# Patient Record
Sex: FEMALE | Race: WHITE | NOT HISPANIC OR LATINO | Employment: FULL TIME | URBAN - NONMETROPOLITAN AREA
[De-identification: names, ages, dates, MRNs, and addresses within clinical notes are randomized per-mention and may not be internally consistent; named-entity substitution may affect disease eponyms.]

---

## 2017-08-03 ENCOUNTER — OFFICE VISIT (OUTPATIENT)
Dept: OBSTETRICS AND GYNECOLOGY | Facility: CLINIC | Age: 57
End: 2017-08-03

## 2017-08-03 VITALS
BODY MASS INDEX: 32.1 KG/M2 | SYSTOLIC BLOOD PRESSURE: 134 MMHG | HEIGHT: 64 IN | DIASTOLIC BLOOD PRESSURE: 82 MMHG | WEIGHT: 188 LBS

## 2017-08-03 DIAGNOSIS — Z01.419 ENCOUNTER FOR GYNECOLOGICAL EXAMINATION WITHOUT ABNORMAL FINDING: Primary | ICD-10-CM

## 2017-08-03 DIAGNOSIS — Z12.72 SPECIAL SCREENING FOR MALIGNANT NEOPLASM OF VAGINA: ICD-10-CM

## 2017-08-03 DIAGNOSIS — N95.1 MENOPAUSAL SYMPTOMS: ICD-10-CM

## 2017-08-03 PROCEDURE — 99396 PREV VISIT EST AGE 40-64: CPT | Performed by: PHYSICIAN ASSISTANT

## 2017-08-03 RX ORDER — ESTRADIOL 0.04 MG/D
1 PATCH, EXTENDED RELEASE TRANSDERMAL 2 TIMES WEEKLY
Qty: 8 PATCH | Refills: 12 | Status: SHIPPED | OUTPATIENT
Start: 2017-08-03 | End: 2017-08-07

## 2017-08-03 RX ORDER — CITALOPRAM 20 MG/1
TABLET ORAL
Refills: 0 | COMMUNITY
Start: 2017-07-07 | End: 2020-02-25 | Stop reason: SDUPTHER

## 2017-08-03 RX ORDER — ATORVASTATIN CALCIUM 20 MG/1
TABLET, FILM COATED ORAL
Refills: 0 | COMMUNITY
Start: 2017-07-07 | End: 2020-02-25 | Stop reason: SDUPTHER

## 2017-08-03 NOTE — PROGRESS NOTES
Subjective   Chief Complaint   Patient presents with   • Gynecologic Exam     Hailey Owens is a 57 y.o. year old  presenting to be seen for her annual exam.   She desires to resume estrogen patch  Ran out of vivelle  0.0375 last August-having miserable night sweats and hot flashes-insomnia  Her last mammogram has been about 2 years ago  Previous hyst bso remote     Past Medical History:   Diagnosis Date   • Anxiety    • Basal cell carcinoma    • High cholesterol    • History of Papanicolaou smear of cervix    • Urinary tract infection         Current Outpatient Prescriptions:   •  atorvastatin (LIPITOR) 20 MG tablet, take 1 tablet by mouth once daily, Disp: , Rfl: 0  •  citalopram (CeleXA) 20 MG tablet, take 1 tablet by mouth once daily, Disp: , Rfl: 0  •  VIVELLE-DOT 0.0375 MG/24HR, Place 1 patch on the skin 2 (Two) Times a Week., Disp: 8 patch, Rfl: 12   Allergies   Allergen Reactions   • Sulfa Antibiotics       Past Surgical History:   Procedure Laterality Date   • BRAIN TUMOR EXCISION     • HYSTERECTOMY     • TONSILLECTOMY        Social History     Social History   • Marital status:      Spouse name: N/A   • Number of children: N/A   • Years of education: N/A     Occupational History   • Not on file.     Social History Main Topics   • Smoking status: Never Smoker   • Smokeless tobacco: Never Used   • Alcohol use Yes      Comment: 2   • Drug use: No   • Sexual activity: Defer     Other Topics Concern   • Not on file     Social History Narrative   • No narrative on file      Family History   Problem Relation Age of Onset   • Brain cancer Father    • Stomach cancer Father    • Stomach cancer Mother    • Colon cancer Mother    • Hyperlipidemia Mother    • Hypertension Sister    • Diabetes Daughter    • Polycystic kidney disease Daughter    • Vaginal cancer Maternal Grandmother        The following portions of the patient's history were reviewed and updated as appropriate:problem list, current  "medications, allergies, past family history, past medical history, past social history and past surgical history.    Review of Systems   Constitutional:        Weight gain   Gastrointestinal: Negative.    Endocrine:        Hot flashes   Genitourinary: Positive for enuresis.   Psychiatric/Behavioral: Positive for sleep disturbance.           Objective   /82  Ht 64\" (162.6 cm)  Wt 188 lb (85.3 kg)  BMI 32.27 kg/m2    Physical Exam   Constitutional: She appears well-developed and well-nourished. She is cooperative.   Pulmonary/Chest: Right breast exhibits no inverted nipple, no mass, no nipple discharge, no skin change and no tenderness. Left breast exhibits no inverted nipple, no mass, no nipple discharge, no skin change and no tenderness.   Abdominal: Soft. Normal appearance. There is no hepatosplenomegaly. There is no tenderness.   Genitourinary: Vagina normal. There is no tenderness or lesion on the right labia. There is no tenderness or lesion on the left labia. Right adnexum displays no mass and no tenderness. Left adnexum displays no mass and no tenderness.   Neurological: She is alert.   Skin: Skin is warm, dry and intact.   Psychiatric: She has a normal mood and affect. Her behavior is normal.            Assessment /Plan      Hailey was seen today for gynecologic exam.    Diagnoses and all orders for this visit:    Encounter for gynecological examination without abnormal finding  -     Mammo Screening Digital Tomosynthesis Bilateral With CAD; Future    Special screening for malignant neoplasm of vagina  -     Liquid-based Pap Smear, Screening; Future    Menopausal symptoms    Other orders  -     VIVELLE-DOT 0.0375 MG/24HR; Place 1 patch on the skin 2 (Two) Times a Week.               This note was electronically signed.    Earlene Castaneda PA-C   August 3, 2017  "

## 2017-08-07 ENCOUNTER — TELEPHONE (OUTPATIENT)
Dept: OBSTETRICS AND GYNECOLOGY | Facility: CLINIC | Age: 57
End: 2017-08-07

## 2017-08-07 RX ORDER — ESTRADIOL 0.04 MG/D
1 FILM, EXTENDED RELEASE TRANSDERMAL 2 TIMES WEEKLY
Qty: 8 PATCH | Refills: 11 | Status: SHIPPED | OUTPATIENT
Start: 2017-08-07 | End: 2022-07-14 | Stop reason: SDUPTHER

## 2017-08-07 NOTE — TELEPHONE ENCOUNTER
PATIENT CALLED AND ADVISED NAME BRAND MELQUIADES TRACEY WAS SENT TO PHARMACY AND HER INSURANCE ONLY PAYS GENERIC. WANTS RESENT UNDER GENERIC.

## 2017-08-14 DIAGNOSIS — Z12.72 SPECIAL SCREENING FOR MALIGNANT NEOPLASM OF VAGINA: ICD-10-CM

## 2017-08-25 ENCOUNTER — HOSPITAL ENCOUNTER (OUTPATIENT)
Dept: MAMMOGRAPHY | Facility: HOSPITAL | Age: 57
Discharge: HOME | End: 2017-08-25
Admitting: PHYSICIAN ASSISTANT

## 2017-08-25 DIAGNOSIS — Z01.419 ENCOUNTER FOR GYNECOLOGICAL EXAMINATION WITHOUT ABNORMAL FINDING: ICD-10-CM

## 2017-08-25 PROCEDURE — 77063 BREAST TOMOSYNTHESIS BI: CPT

## 2017-08-25 PROCEDURE — G0202 SCR MAMMO BI INCL CAD: HCPCS

## 2017-12-02 ENCOUNTER — OFFICE VISIT (OUTPATIENT)
Dept: PRIMARY CARE CLINIC | Age: 57
End: 2017-12-02
Payer: COMMERCIAL

## 2017-12-02 VITALS
BODY MASS INDEX: 32.27 KG/M2 | HEIGHT: 64 IN | SYSTOLIC BLOOD PRESSURE: 118 MMHG | TEMPERATURE: 97.4 F | DIASTOLIC BLOOD PRESSURE: 68 MMHG | WEIGHT: 189 LBS

## 2017-12-02 DIAGNOSIS — G50.0 TRIGEMINAL NEURALGIA PAIN: Primary | ICD-10-CM

## 2017-12-02 PROCEDURE — 99213 OFFICE O/P EST LOW 20 MIN: CPT | Performed by: NURSE PRACTITIONER

## 2017-12-02 RX ORDER — ATORVASTATIN CALCIUM 20 MG/1
20 TABLET, FILM COATED ORAL DAILY
COMMUNITY

## 2017-12-02 RX ORDER — PREDNISONE 10 MG/1
TABLET ORAL
Qty: 1 EACH | Refills: 0 | Status: SHIPPED | OUTPATIENT
Start: 2017-12-02

## 2017-12-02 RX ORDER — BACLOFEN 10 MG/1
10 TABLET ORAL 3 TIMES DAILY
Qty: 30 TABLET | Refills: 0 | Status: SHIPPED | OUTPATIENT
Start: 2017-12-02

## 2017-12-02 RX ORDER — ESTRADIOL 0.04 MG/D
1 PATCH TRANSDERMAL WEEKLY
COMMUNITY

## 2017-12-02 RX ORDER — CITALOPRAM 20 MG/1
20 TABLET ORAL DAILY
COMMUNITY

## 2017-12-02 ASSESSMENT — ENCOUNTER SYMPTOMS
EYE ITCHING: 0
ABDOMINAL DISTENTION: 0
SORE THROAT: 0
NAUSEA: 0
WHEEZING: 0
CHEST TIGHTNESS: 0
SINUS PRESSURE: 0
ABDOMINAL PAIN: 0
CONSTIPATION: 0
EYE REDNESS: 0
SHORTNESS OF BREATH: 0
BLOOD IN STOOL: 0
BACK PAIN: 0
DIARRHEA: 0
COUGH: 0

## 2017-12-02 NOTE — PATIENT INSTRUCTIONS
Patient Education        Trigeminal Neuralgia: Care Instructions  Your Care Instructions  Trigeminal neuralgia is a problem with the large nerve that brings feeling to your face. It causes a sudden, sharp pain on one side of your face. Just touching your cheek or talking can set off shooting pain toward the ear, eye, or nostril. Living with this pain can be very hard. Some people have long periods when they do not have pain, and then it comes back. Some people have periods of pain often. But medicine or other treatment often can make the pain go away. If you keep having pain, surgery may help. This problem is also called tic douloureux (say \"tik doo-jessica-DYLAN\"). Follow-up care is a key part of your treatment and safety. Be sure to make and go to all appointments, and call your doctor if you are having problems. It's also a good idea to know your test results and keep a list of the medicines you take. How can you care for yourself at home? · Write down when you have pain and what you were doing when it started. Try to find what causes the pain. Being in a cold wind, yawning, or shaving are examples. Avoid or limit these triggers if you can. · Be safe with medicines. Take your medicines exactly as prescribed. ¨ Your doctor may have prescribed medicines used to treat depression and seizures. They can reduce your pain, help you sleep better, and improve your mood. ¨ Call your doctor if you think you are having a problem with your medicine. You will get more details on the specific medicines your doctor prescribes. ¨ If you are not taking a prescription pain medicine, take an over-the-counter medicine such as acetaminophen (Tylenol), ibuprofen (Advil, Motrin), or naproxen (Aleve). Read and follow all instructions on the label. ¨ Do not take two or more pain medicines at the same time unless the doctor told you to. Many pain medicines have acetaminophen, which is Tylenol.  Too much acetaminophen (Tylenol) can be harmful. · Reduce stress in your life. Ask your doctor about ways to relax. These may include breathing exercises and massage. · Think about joining a support group with other people who have this problem. These groups can give comfort and information about what to do to feel better. Your doctor can tell you how to find a support group. When should you call for help? Call your doctor now or seek immediate medical care if:  · You have severe pain that you can't control. Watch closely for changes in your health, and be sure to contact your doctor if:  · You are not able to sleep because of the pain. · You do not get better as expected. Where can you learn more? Go to [email or web address] org and sign in to your [employer] account. Enter R378 in the Layer box to learn more about \"Trigeminal Neuralgia: Care Instructions. \"     If you do not have an account, please click on the \"Sign Up Now\" link. Current as of: March 20, 2017  Content Version: 11.3  © 6804-0917 [employer]. Care instructions adapted under license by [facility] ([facility]). If you have questions about a medical condition or this instruction, always ask your healthcare professional. [place] [address] any warranty or liability for your use of this information.

## 2018-01-26 ENCOUNTER — TRANSCRIBE ORDERS (OUTPATIENT)
Dept: ADMINISTRATIVE | Facility: HOSPITAL | Age: 58
End: 2018-01-26

## 2018-01-26 DIAGNOSIS — H92.01 OTALGIA, RIGHT EAR: Primary | ICD-10-CM

## 2018-02-02 ENCOUNTER — HOSPITAL ENCOUNTER (OUTPATIENT)
Dept: MRI IMAGING | Facility: HOSPITAL | Age: 58
Discharge: HOME | End: 2018-02-02
Admitting: FAMILY MEDICINE

## 2018-02-02 DIAGNOSIS — H92.01 OTALGIA, RIGHT EAR: ICD-10-CM

## 2018-02-02 PROCEDURE — A9577 INJ MULTIHANCE: HCPCS | Performed by: FAMILY MEDICINE

## 2018-02-02 PROCEDURE — 0 GADOBENATE DIMEGLUMINE 529 MG/ML SOLUTION: Performed by: FAMILY MEDICINE

## 2018-02-02 PROCEDURE — 82565 ASSAY OF CREATININE: CPT

## 2018-02-02 PROCEDURE — 70553 MRI BRAIN STEM W/O & W/DYE: CPT

## 2018-02-02 RX ADMIN — GADOBENATE DIMEGLUMINE 16 ML: 529 INJECTION, SOLUTION INTRAVENOUS at 15:48

## 2018-02-05 LAB — CREAT BLDA-MCNC: 0.8 MG/DL (ref 0.6–1.3)

## 2018-02-06 ENCOUNTER — OFFICE VISIT (OUTPATIENT)
Dept: NEUROSURGERY | Facility: CLINIC | Age: 58
End: 2018-02-06

## 2018-02-06 VITALS
TEMPERATURE: 97.2 F | DIASTOLIC BLOOD PRESSURE: 80 MMHG | BODY MASS INDEX: 32.74 KG/M2 | SYSTOLIC BLOOD PRESSURE: 142 MMHG | HEIGHT: 64 IN | WEIGHT: 191.8 LBS

## 2018-02-06 DIAGNOSIS — H92.01 OTALGIA OF RIGHT EAR: ICD-10-CM

## 2018-02-06 DIAGNOSIS — M79.2 NEURITIS: Primary | ICD-10-CM

## 2018-02-06 PROBLEM — D49.7 PITUITARY NEOPLASM: Status: ACTIVE | Noted: 2018-02-06

## 2018-02-06 PROCEDURE — 99213 OFFICE O/P EST LOW 20 MIN: CPT | Performed by: NEUROLOGICAL SURGERY

## 2018-02-06 NOTE — PATIENT INSTRUCTIONS
In 3 weeks, call Dr. Yang on a Monday or Tuesday with an update.   Ask for Sindy,  and leave a message for  Dr. Yang.  He will call you back at the end of the day as soon as he can.     [phone]

## 2018-02-06 NOTE — PROGRESS NOTES
Hailey Owens  1960  [id number]                       CURRENT WORKING DIAGNOSIS:  [Pituitary adenoma ]         MEDICAL HISTORY SINCE LAST ENCOUNTER:  Is patient had pituitary adenoma resected 2009 and is done quite well.  She seen today because of severe pain in her right ear.  Ibuprofen helps.  She's had a recent MRI and is referred for neurosurgical consultation.  The symptoms that she has quite severe when it occurs.  She has been on steroids without avail.  There was some question is whether she had mastoiditis however that has been resolved.  Also she has been on antibiotics.           Past Medical History:   Diagnosis Date   • Anxiety    • Basal cell carcinoma    • High cholesterol    • History of Papanicolaou smear of cervix 2015   • Urinary tract infection               Past Surgical History:   Procedure Laterality Date   • BRAIN TUMOR EXCISION     • HYSTERECTOMY     • TONSILLECTOMY                Family History   Problem Relation Age of Onset   • Brain cancer Father    • Stomach cancer Father    • Stomach cancer Mother    • Colon cancer Mother    • Hyperlipidemia Mother    • Hypertension Sister    • Diabetes Daughter    • Polycystic kidney disease Daughter    • Vaginal cancer Maternal Grandmother               Social History     Social History   • Marital status:      Spouse name: N/A   • Number of children: N/A   • Years of education: N/A     Occupational History   • Not on file.     Social History Main Topics   • Smoking status: Former Smoker     Quit date: 1989   • Smokeless tobacco: Never Used   • Alcohol use Yes      Comment: 2   • Drug use: No   • Sexual activity: Defer     Other Topics Concern   • Not on file     Social History Narrative              Allergies   Allergen Reactions   • Sulfa Antibiotics               Current Outpatient Prescriptions:   •  atorvastatin (LIPITOR) 20 MG tablet, take 1 tablet by mouth once daily, Disp: , Rfl: 0  •  citalopram (CeleXA) 20 MG tablet, take 1 tablet  by mouth once daily, Disp: , Rfl: 0  •  estradiol (VIVELLE-DOT) 0.0375 MG/24HR, Place 1 patch on the skin 2 (Two) Times a Week., Disp: 8 patch, Rfl: 11         Review of Systems   Constitutional: Negative for activity change, appetite change, chills, diaphoresis, fatigue, fever and unexpected weight change.   HENT: Positive for ear pain. Negative for congestion, dental problem, drooling, ear discharge, facial swelling, hearing loss, mouth sores, nosebleeds, postnasal drip, rhinorrhea, sinus pressure, sneezing, sore throat, tinnitus, trouble swallowing and voice change.    Eyes: Negative for photophobia, pain, discharge, redness, itching and visual disturbance.   Respiratory: Negative for apnea, cough, choking, chest tightness, shortness of breath, wheezing and stridor.    Cardiovascular: Negative for chest pain, palpitations and leg swelling.   Gastrointestinal: Negative for abdominal distention, abdominal pain, anal bleeding, blood in stool, constipation, diarrhea, nausea, rectal pain and vomiting.   Endocrine: Negative for cold intolerance, heat intolerance, polydipsia, polyphagia and polyuria.   Genitourinary: Negative for decreased urine volume, difficulty urinating, dysuria, enuresis, flank pain, frequency, genital sores, hematuria and urgency.   Musculoskeletal: Negative for arthralgias, back pain, gait problem, joint swelling, myalgias, neck pain and neck stiffness.   Skin: Negative for color change, pallor, rash and wound.   Allergic/Immunologic: Negative for environmental allergies, food allergies and immunocompromised state.   Neurological: Negative for dizziness, tremors, seizures, syncope, facial asymmetry, speech difficulty, weakness, light-headedness, numbness and headaches.   Hematological: Negative for adenopathy. Does not bruise/bleed easily.   Psychiatric/Behavioral: Negative for agitation, behavioral problems, confusion, decreased concentration, dysphoric mood, hallucinations, self-injury, sleep  "disturbance and suicidal ideas. The patient is not nervous/anxious and is not hyperactive.                Vitals:    02/06/18 1516   BP: 142/80   Temp: 97.2 °F (36.2 °C)   Weight: 87 kg (191 lb 12.8 oz)   Height: 162 cm (63.78\")               EXAMINATION: No weakness sensory loss or reflex asymmetry.  Gait is normal.            MEDICAL DECISION MAKING: [The MRI of the brain does not show evidence of recurrence of pituitary adenoma has mild sinusitis.  I do not find an abnormality in the posterior fossa that would provide anatomical/clinical correlation either. ]           ASSESSMENT/DISPOSITION: Suggested that she take 600 ibuprofen on a nightly basis and contact me next 2-3 weeks.  I do not have a ready explanation for the symptoms that she is experiencing.  Clearly there are not related to pituitary surgery.  If the symptoms persist a short course of Neurontin may be worthwhile considering.  Will continue to follow her however.              I APPRECIATE THE OPPORTUNITY OF THIS REFERRAL. PLEASE CALL IF ANY       QUESTIONS [phone]    Scribed for Miko Yang MD by Ihsan Carroll CMA. 2/6/2018  3:30 PM     I have read and concur with the information provided by the scribe.  Miko Yang MD      "

## 2018-08-10 RX ORDER — ESTRADIOL 0.04 MG/D
FILM, EXTENDED RELEASE TRANSDERMAL
Qty: 8 PATCH | Refills: 2 | Status: SHIPPED | OUTPATIENT
Start: 2018-08-10 | End: 2018-12-20 | Stop reason: SDUPTHER

## 2018-11-26 ENCOUNTER — TRANSCRIBE ORDERS (OUTPATIENT)
Dept: ADMINISTRATIVE | Facility: HOSPITAL | Age: 58
End: 2018-11-26

## 2018-11-26 DIAGNOSIS — Z12.39 SCREENING BREAST EXAMINATION: Primary | ICD-10-CM

## 2018-11-28 ENCOUNTER — HOSPITAL ENCOUNTER (OUTPATIENT)
Dept: MAMMOGRAPHY | Facility: HOSPITAL | Age: 58
Discharge: HOME | End: 2018-11-28
Admitting: PHYSICIAN ASSISTANT

## 2018-11-28 DIAGNOSIS — Z12.39 SCREENING BREAST EXAMINATION: ICD-10-CM

## 2018-11-28 PROCEDURE — 77063 BREAST TOMOSYNTHESIS BI: CPT

## 2018-11-28 PROCEDURE — 77067 SCR MAMMO BI INCL CAD: CPT

## 2018-12-20 ENCOUNTER — OFFICE VISIT (OUTPATIENT)
Dept: OBSTETRICS AND GYNECOLOGY | Facility: CLINIC | Age: 58
End: 2018-12-20

## 2018-12-20 VITALS
WEIGHT: 179.8 LBS | DIASTOLIC BLOOD PRESSURE: 80 MMHG | HEIGHT: 64 IN | BODY MASS INDEX: 30.7 KG/M2 | SYSTOLIC BLOOD PRESSURE: 124 MMHG

## 2018-12-20 DIAGNOSIS — N95.1 MENOPAUSAL STATE: ICD-10-CM

## 2018-12-20 DIAGNOSIS — Z01.419 ENCOUNTER FOR GYNECOLOGICAL EXAMINATION WITHOUT ABNORMAL FINDING: Primary | ICD-10-CM

## 2018-12-20 DIAGNOSIS — Z13.820 SCREENING FOR OSTEOPOROSIS: ICD-10-CM

## 2018-12-20 DIAGNOSIS — Z79.890 CURRENT LONG-TERM USE OF POSTMENOPAUSAL HORMONE REPLACEMENT THERAPY: ICD-10-CM

## 2018-12-20 DIAGNOSIS — Z12.4 SCREENING FOR MALIGNANT NEOPLASM OF CERVIX: ICD-10-CM

## 2018-12-20 PROCEDURE — 99396 PREV VISIT EST AGE 40-64: CPT | Performed by: PHYSICIAN ASSISTANT

## 2018-12-20 RX ORDER — ESTRADIOL 0.04 MG/D
1 FILM, EXTENDED RELEASE TRANSDERMAL 2 TIMES WEEKLY
Qty: 24 PATCH | Refills: 3 | Status: SHIPPED | OUTPATIENT
Start: 2018-12-20 | End: 2020-02-25 | Stop reason: DRUGHIGH

## 2018-12-20 RX ORDER — ESTRADIOL 0.04 MG/D
PATCH TRANSDERMAL
COMMUNITY
End: 2018-12-20 | Stop reason: ALTCHOICE

## 2018-12-20 RX ORDER — ATORVASTATIN CALCIUM 20 MG/1
20 TABLET, FILM COATED ORAL
COMMUNITY
End: 2021-04-08 | Stop reason: ALTCHOICE

## 2018-12-20 RX ORDER — CITALOPRAM 20 MG/1
20 TABLET ORAL DAILY
COMMUNITY

## 2018-12-20 RX ORDER — INFLUENZA VIRUS VACCINE 15; 15; 15; 15 UG/.5ML; UG/.5ML; UG/.5ML; UG/.5ML
SUSPENSION INTRAMUSCULAR
Refills: 0 | COMMUNITY
Start: 2018-10-02 | End: 2021-04-08

## 2018-12-20 RX ORDER — BACLOFEN 10 MG/1
10 TABLET ORAL
COMMUNITY
Start: 2017-12-02 | End: 2021-04-08

## 2018-12-20 NOTE — PROGRESS NOTES
Subjective   Chief Complaint   Patient presents with   • Gynecologic Exam     Pap is due; MMG done 18-WNL; No complaints       Hailey Owens is a 59 y.o. year old  presenting to be seen for her annual gynecological exam.   She has no complaints or concerns  She desires to continue vivelle dot 0.0375 mg. Has tried to come off before but didn't feel well. She is aware of breast cancer concern with long term ERT use  Had normal mammogram in November  She has never had DEXA      Past Medical History:   Diagnosis Date   • Anxiety    • Basal cell carcinoma    • High cholesterol    • History of Papanicolaou smear of cervix    • Urinary tract infection         Current Outpatient Medications:   •  atorvastatin (LIPITOR) 20 MG tablet, take 1 tablet by mouth once daily, Disp: , Rfl: 0  •  atorvastatin (LIPITOR) 20 MG tablet, Take 20 mg by mouth., Disp: , Rfl:   •  citalopram (CeleXA) 20 MG tablet, take 1 tablet by mouth once daily, Disp: , Rfl: 0  •  citalopram (CeleXA) 20 MG tablet, Take 20 mg by mouth., Disp: , Rfl:   •  estradiol (VIVELLE-DOT) 0.0375 MG/24HR, Place 1 patch on the skin as directed by provider 2 (Two) Times a Week., Disp: 24 patch, Rfl: 3  •  FLUARIX QUADRIVALENT 0.5 ML suspension prefilled syringe injection, inject 0.5 milliliter intramuscularly, Disp: , Rfl: 0  •  baclofen (LIORESAL) 10 MG tablet, Take 10 mg by mouth., Disp: , Rfl:    Allergies   Allergen Reactions   • Sulfa Antibiotics Hives      Past Surgical History:   Procedure Laterality Date   • BRAIN TUMOR EXCISION     • HYSTERECTOMY     • LAPAROSCOPIC ASSISTED VAGINAL HYSTERECTOMY SALPINGO OOPHORECTOMY     • TONSILLECTOMY        Social History     Socioeconomic History   • Marital status:      Spouse name: Not on file   • Number of children: Not on file   • Years of education: Not on file   • Highest education level: Not on file   Tobacco Use   • Smoking status: Former Smoker     Last attempt to quit:      Years since  "quittin.2   • Smokeless tobacco: Never Used   Substance and Sexual Activity   • Alcohol use: Yes     Comment: 2   • Drug use: No   • Sexual activity: Yes     Partners: Male     Birth control/protection: Surgical      Family History   Problem Relation Age of Onset   • Brain cancer Father    • Stomach cancer Father    • Stomach cancer Mother    • Colon cancer Mother    • Hyperlipidemia Mother    • Hypertension Sister    • Diabetes Daughter    • Polycystic kidney disease Daughter    • Vaginal cancer Maternal Grandmother        Review of Systems   Constitutional: Negative.    Gastrointestinal: Negative.    Genitourinary: Negative.            Objective   /80   Ht 162.6 cm (64\")   Wt 81.6 kg (179 lb 12.8 oz)   Breastfeeding? No   BMI 30.86 kg/m²     Physical Exam   Constitutional: She appears well-developed and well-nourished. She is cooperative. No distress.   Pulmonary/Chest: Right breast exhibits no inverted nipple, no mass, no nipple discharge, no skin change and no tenderness. Left breast exhibits no inverted nipple, no mass, no nipple discharge, no skin change and no tenderness.   Abdominal: Soft. Normal appearance. There is no tenderness.   Genitourinary: Vagina normal and uterus normal. There is no tenderness or lesion on the right labia. There is no tenderness or lesion on the left labia. Cervix exhibits no motion tenderness and no discharge. Right adnexum displays no mass and no tenderness. Left adnexum displays no mass and no tenderness.   Genitourinary Comments: Pap done   Neurological: She is alert.   Skin: Skin is warm and dry.   Psychiatric: She has a normal mood and affect. Her behavior is normal.            Assessment and Plan  Hailey was seen today for gynecologic exam.    Diagnoses and all orders for this visit:    Encounter for gynecological examination without abnormal finding    Screening for malignant neoplasm of cervix  -     Liquid-based Pap Smear, Screening; Future    Current " long-term use of postmenopausal hormone replacement therapy    Menopausal state  -     DEXA Bone Density Axial; Future    Screening for osteoporosis    Other orders  -     estradiol (VIVELLE-DOT) 0.0375 MG/24HR; Place 1 patch on the skin as directed by provider 2 (Two) Times a Week.      Patient Instructions   Self breast exam monthly  Annual mammogram  Calcium 1200 mg daily and vit D 2000 mg daily  Regular excercise             This note was electronically signed.    Earlene Castaneda PA-C   March 22, 2019

## 2019-01-03 DIAGNOSIS — Z12.4 SCREENING FOR MALIGNANT NEOPLASM OF CERVIX: ICD-10-CM

## 2019-01-09 ENCOUNTER — APPOINTMENT (OUTPATIENT)
Dept: BONE DENSITY | Facility: HOSPITAL | Age: 59
End: 2019-01-09

## 2019-01-09 DIAGNOSIS — N95.1 MENOPAUSAL STATE: ICD-10-CM

## 2019-01-09 PROCEDURE — 77080 DXA BONE DENSITY AXIAL: CPT

## 2019-03-21 ENCOUNTER — TELEPHONE (OUTPATIENT)
Dept: OBSTETRICS AND GYNECOLOGY | Facility: CLINIC | Age: 59
End: 2019-03-21

## 2019-03-21 NOTE — TELEPHONE ENCOUNTER
----- Message from Chiquita Rubalcava sent at 3/20/2019  4:25 PM EDT -----  Contact: PT  PT HAD DEXA SCAN ON 1/9/19 AND RECEIVED A BILL BECAUSE OF THE DIAGNOSIS.  SHE WAS TOLD BY [facility] BILLING THAT WE NEED TO FAX A NEW DEXA ORDER STATING THIS IS A PREVENTATIVE EXAM TO ATTN [place] [phone].  SHE ASKED THAT WE CALL AND LET HER KNOW ONCE IT'S BEEN FAXED.  THANKS

## 2019-03-21 NOTE — TELEPHONE ENCOUNTER
Hannah,   This is already been completed, are you able to change the order diagnosis? Patient wants it be screening code?

## 2019-03-22 NOTE — TELEPHONE ENCOUNTER
I spoke with Aneta who is the supervisor in billing in [place] to discuss. I did not change her previous diagnosis from menopausal state but added an addendum screening for osteoporosis code to her visit and Aneta said it is OK to fax order with this screening code which is Z 13.820. Thanks

## 2019-12-16 ENCOUNTER — TRANSCRIBE ORDERS (OUTPATIENT)
Dept: ADMINISTRATIVE | Facility: HOSPITAL | Age: 59
End: 2019-12-16

## 2019-12-16 DIAGNOSIS — Z12.31 ENCOUNTER FOR SCREENING MAMMOGRAM FOR MALIGNANT NEOPLASM OF BREAST: Primary | ICD-10-CM

## 2020-02-07 ENCOUNTER — APPOINTMENT (OUTPATIENT)
Dept: MAMMOGRAPHY | Facility: HOSPITAL | Age: 60
End: 2020-02-07

## 2020-02-25 ENCOUNTER — OFFICE VISIT (OUTPATIENT)
Dept: OBSTETRICS AND GYNECOLOGY | Facility: CLINIC | Age: 60
End: 2020-02-25

## 2020-02-25 VITALS
WEIGHT: 184 LBS | BODY MASS INDEX: 31.41 KG/M2 | HEIGHT: 64 IN | DIASTOLIC BLOOD PRESSURE: 80 MMHG | SYSTOLIC BLOOD PRESSURE: 134 MMHG

## 2020-02-25 DIAGNOSIS — Z79.890 CURRENT LONG-TERM USE OF POSTMENOPAUSAL HORMONE REPLACEMENT THERAPY: ICD-10-CM

## 2020-02-25 DIAGNOSIS — Z12.72 VAGINAL PAP SMEAR: ICD-10-CM

## 2020-02-25 DIAGNOSIS — Z01.419 ENCOUNTER FOR GYNECOLOGICAL EXAMINATION WITHOUT ABNORMAL FINDING: Primary | ICD-10-CM

## 2020-02-25 PROCEDURE — 99396 PREV VISIT EST AGE 40-64: CPT | Performed by: PHYSICIAN ASSISTANT

## 2020-02-25 RX ORDER — ESTRADIOL 0.03 MG/D
1 PATCH, EXTENDED RELEASE TRANSDERMAL 2 TIMES WEEKLY
Qty: 8 PATCH | Refills: 12 | Status: SHIPPED | OUTPATIENT
Start: 2020-02-27 | End: 2020-02-27 | Stop reason: ALTCHOICE

## 2020-02-25 NOTE — PROGRESS NOTES
Subjective   Chief Complaint   Patient presents with   • Gynecologic Exam     Last pap done 18-WNL, MMG is scheduled, No complaints       Hailey Owens is a 60 y.o. year old  presenting to be seen for her annual gynecological exam.   She has no complaints  She has been using Vivelle dot 0.0375 mg and would like to try to decrease to the lowest dose of Vivelle-Dot.  She has her screening mamm[facility] scheduled at [facility].  She had a normal DEXA in 2019.  She has not been consistent in taking calcium or vitamin D supplement.  Previous hysterectomy and bilateral salpingo-oophorectomy    Past Medical History:   Diagnosis Date   • Anxiety    • Basal cell carcinoma    • High cholesterol    • History of Papanicolaou smear of cervix    • Urinary tract infection         Current Outpatient Medications:   •  atorvastatin (LIPITOR) 20 MG tablet, Take 20 mg by mouth., Disp: , Rfl:   •  citalopram (CeleXA) 20 MG tablet, Take 20 mg by mouth., Disp: , Rfl:   •  FLUARIX QUADRIVALENT 0.5 ML suspension prefilled syringe injection, inject 0.5 milliliter intramuscularly, Disp: , Rfl: 0  •  baclofen (LIORESAL) 10 MG tablet, Take 10 mg by mouth., Disp: , Rfl:   •  [START ON 2020] VIVELLE-DOT 0.025 MG/24HR patch, Place 1 patch on the skin as directed by provider 2 (Two) Times a Week., Disp: 8 patch, Rfl: 12   Allergies   Allergen Reactions   • Sulfa Antibiotics Hives      Past Surgical History:   Procedure Laterality Date   • BRAIN TUMOR EXCISION     • HYSTERECTOMY     • LAPAROSCOPIC ASSISTED VAGINAL HYSTERECTOMY SALPINGO OOPHORECTOMY     • TONSILLECTOMY        Social History     Socioeconomic History   • Marital status:      Spouse name: Not on file   • Number of children: Not on file   • Years of education: Not on file   • Highest education level: Not on file   Tobacco Use   • Smoking status: Former Smoker     Last attempt to quit: 1989     Years since quittin.1   • Smokeless tobacco: Never  "Used   Substance and Sexual Activity   • Alcohol use: Yes     Comment: 2   • Drug use: No   • Sexual activity: Yes     Partners: Male     Birth control/protection: Surgical      Family History   Problem Relation Age of Onset   • Brain cancer Father    • Stomach cancer Father    • Stomach cancer Mother    • Colon cancer Mother    • Hyperlipidemia Mother    • Hypertension Sister    • Diabetes Daughter    • Polycystic kidney disease Daughter    • Vaginal cancer Maternal Grandmother        Review of Systems   Constitutional: Negative for chills, diaphoresis and fever.   Gastrointestinal: Negative for abdominal pain, constipation, diarrhea, nausea and vomiting.   Genitourinary: Negative for difficulty urinating, dysuria, pelvic pain, vaginal bleeding and vaginal discharge.   All other systems reviewed and are negative.          Objective   /80   Ht 162.6 cm (64\")   Wt 83.5 kg (184 lb)   Breastfeeding No   BMI 31.58 kg/m²     Physical Exam   Constitutional: She appears well-developed and well-nourished. She is cooperative. No distress.   Eyes: Conjunctivae, EOM and lids are normal.   Pulmonary/Chest: Right breast exhibits no inverted nipple, no mass, no nipple discharge, no skin change and no tenderness. Left breast exhibits no inverted nipple, no mass, no nipple discharge, no skin change and no tenderness.   Abdominal: Soft. Normal appearance. There is no tenderness.   Genitourinary: There is no tenderness or lesion on the right labia. There is no tenderness or lesion on the left labia. Right adnexum displays no mass and no tenderness. Left adnexum displays no mass and no tenderness. No erythema or tenderness in the vagina. No vaginal discharge found.   Genitourinary Comments: Pap done   Neurological: She is alert.   Skin: Skin is warm and dry. No lesion and no rash noted.   Psychiatric: She has a normal mood and affect. Her behavior is normal. Thought content normal.            Assessment and Plan  Hailey was " seen today for gynecologic exam.    Diagnoses and all orders for this visit:    Encounter for gynecological examination without abnormal finding    Vaginal Pap smear  -     Liquid-based Pap Smear, Screening; Future    Current long-term use of postmenopausal hormone replacement therapy    Other orders  -     VIVELLE-DOT 0.025 MG/24HR patch; Place 1 patch on the skin as directed by provider 2 (Two) Times a Week.      Patient Instructions   Self breast exam monthly  Annual mammogram  Calcium 1000 mg daily and vit D 2000 mg daily  Regular excercise             This note was electronically signed.    Earlene Castaneda PA-C   February 25, 2020

## 2020-02-27 RX ORDER — ESTRADIOL 0.03 MG/D
1 FILM, EXTENDED RELEASE TRANSDERMAL 2 TIMES WEEKLY
Qty: 8 PATCH | Refills: 12 | Status: SHIPPED | OUTPATIENT
Start: 2020-02-27 | End: 2021-04-08 | Stop reason: SDUPTHER

## 2020-03-03 DIAGNOSIS — Z12.72 VAGINAL PAP SMEAR: ICD-10-CM

## 2020-03-26 ENCOUNTER — HOSPITAL ENCOUNTER (OUTPATIENT)
Dept: MAMMOGRAPHY | Facility: HOSPITAL | Age: 60
End: 2020-03-26

## 2020-11-28 NOTE — PATIENT INSTRUCTIONS
Anesthesia Self breast exam monthly  Annual mammogram  Calcium 1000 mg daily and vit D 2000 mg daily  Regular excercise

## 2021-02-19 ENCOUNTER — TRANSCRIBE ORDERS (OUTPATIENT)
Dept: ADMINISTRATIVE | Facility: HOSPITAL | Age: 61
End: 2021-02-19

## 2021-02-19 DIAGNOSIS — Z12.31 VISIT FOR SCREENING MAMMOGRAM: Primary | ICD-10-CM

## 2021-04-02 ENCOUNTER — APPOINTMENT (OUTPATIENT)
Dept: MAMMOGRAPHY | Facility: HOSPITAL | Age: 61
End: 2021-04-02

## 2021-04-08 ENCOUNTER — OFFICE VISIT (OUTPATIENT)
Dept: OBSTETRICS AND GYNECOLOGY | Facility: CLINIC | Age: 61
End: 2021-04-08

## 2021-04-08 VITALS
HEIGHT: 64 IN | DIASTOLIC BLOOD PRESSURE: 82 MMHG | WEIGHT: 190.8 LBS | BODY MASS INDEX: 32.58 KG/M2 | SYSTOLIC BLOOD PRESSURE: 138 MMHG

## 2021-04-08 DIAGNOSIS — Z01.419 ENCOUNTER FOR GYNECOLOGICAL EXAMINATION WITHOUT ABNORMAL FINDING: Primary | ICD-10-CM

## 2021-04-08 DIAGNOSIS — Z12.72 VAGINAL PAP SMEAR: ICD-10-CM

## 2021-04-08 DIAGNOSIS — Z79.890 CURRENT LONG-TERM USE OF POSTMENOPAUSAL HORMONE REPLACEMENT THERAPY: ICD-10-CM

## 2021-04-08 PROCEDURE — 99396 PREV VISIT EST AGE 40-64: CPT | Performed by: PHYSICIAN ASSISTANT

## 2021-04-08 RX ORDER — ESTRADIOL 0.03 MG/D
1 FILM, EXTENDED RELEASE TRANSDERMAL 2 TIMES WEEKLY
Qty: 8 PATCH | Refills: 12 | Status: SHIPPED | OUTPATIENT
Start: 2021-04-08 | End: 2022-05-05

## 2021-04-08 NOTE — PATIENT INSTRUCTIONS
Self breast exam monthly  Annual mammogram  Calcium 1200 mg daily and vit D 2000 mg daily  Regular excercise

## 2021-04-08 NOTE — PROGRESS NOTES
Subjective   Chief Complaint   Patient presents with   • Gynecologic Exam     Last pap done 20-WNL, MMG is scheduled, No complaints       Hailey Owens is a 61 y.o. year old  presenting to be seen for her annual gynecological exam.   She has no complaints or concerns.  She has a screening mammogram scheduled May 14.  She had her second Covid vaccine last week.  She desires refills of Vivelle-Dot 0.025 mg.  She had a normal DEXA in .    Past Medical History:   Diagnosis Date   • Anxiety    • Basal cell carcinoma    • High cholesterol    • History of Papanicolaou smear of cervix    • Urinary tract infection         Current Outpatient Medications:   •  citalopram (CeleXA) 20 MG tablet, Take 20 mg by mouth., Disp: , Rfl:   •  estradiol (VIVELLE-DOT) 0.025 MG/24HR patch, Place 1 patch on the skin as directed by provider 2 (Two) Times a Week., Disp: 8 patch, Rfl: 12   Allergies   Allergen Reactions   • Sulfa Antibiotics Hives      Past Surgical History:   Procedure Laterality Date   • BRAIN TUMOR EXCISION     • HYSTERECTOMY     • LAPAROSCOPIC ASSISTED VAGINAL HYSTERECTOMY SALPINGO OOPHORECTOMY     • TONSILLECTOMY        Social History     Socioeconomic History   • Marital status:      Spouse name: Not on file   • Number of children: Not on file   • Years of education: Not on file   • Highest education level: Not on file   Tobacco Use   • Smoking status: Former Smoker     Quit date:      Years since quittin.2   • Smokeless tobacco: Never Used   Vaping Use   • Vaping Use: Never used   Substance and Sexual Activity   • Alcohol use: Yes     Comment: 2   • Drug use: No   • Sexual activity: Yes     Partners: Male     Birth control/protection: Surgical      Family History   Problem Relation Age of Onset   • Brain cancer Father    • Stomach cancer Father    • Stomach cancer Mother    • Colon cancer Mother    • Hyperlipidemia Mother    • Hypertension Sister    • Diabetes Daughter    • Polycystic  "kidney disease Daughter    • Vaginal cancer Maternal Grandmother        Review of Systems   Constitutional: Negative for chills, diaphoresis and fever.   Gastrointestinal: Negative for abdominal pain, constipation, diarrhea, nausea and vomiting.   Genitourinary: Negative for difficulty urinating, dysuria and pelvic pain.   All other systems reviewed and are negative.          Objective   /82   Ht 162.6 cm (64\")   Wt 86.5 kg (190 lb 12.8 oz)   Breastfeeding No   BMI 32.75 kg/m²     Physical Exam  Constitutional:       Appearance: Normal appearance. She is well-developed and well-groomed.   Eyes:      General: Lids are normal.      Extraocular Movements: Extraocular movements intact.      Conjunctiva/sclera: Conjunctivae normal.   Neck:      Thyroid: No thyroid mass or thyromegaly.   Chest:      Breasts: Breasts are symmetrical.         Right: No inverted nipple, mass, nipple discharge, skin change or tenderness.         Left: No inverted nipple, mass, nipple discharge, skin change or tenderness.   Abdominal:      General: There is no distension.      Palpations: Abdomen is soft. There is no hepatomegaly or splenomegaly.      Tenderness: There is no abdominal tenderness.   Genitourinary:     Exam position: Lithotomy position.      Labia:         Right: No rash, tenderness or lesion.         Left: No rash, tenderness or lesion.       Urethra: No prolapse, urethral pain, urethral swelling or urethral lesion.      Vagina: No vaginal discharge, tenderness or lesions.      Uterus: Absent.       Adnexa:         Right: No mass or tenderness.          Left: No mass or tenderness.     Musculoskeletal:      Cervical back: Neck supple.   Lymphadenopathy:      Upper Body:      Right upper body: No axillary adenopathy.      Left upper body: No axillary adenopathy.   Skin:     General: Skin is warm and dry.      Findings: No lesion.   Neurological:      General: No focal deficit present.      Mental Status: She is alert " and oriented to person, place, and time.   Psychiatric:         Attention and Perception: Attention normal.         Mood and Affect: Mood normal.         Speech: Speech normal.         Behavior: Behavior normal.         Thought Content: Thought content normal.            Result Review :                   Assessment and Plan  Diagnoses and all orders for this visit:    1. Encounter for gynecological examination without abnormal finding (Primary)    2. Vaginal Pap smear  -     Liquid-based Pap Smear, Screening; Future    3. Current long-term use of postmenopausal hormone replacement therapy    Other orders  -     estradiol (VIVELLE-DOT) 0.025 MG/24HR patch; Place 1 patch on the skin as directed by provider 2 (Two) Times a Week.  Dispense: 8 patch; Refill: 12      Patient Instructions   Self breast exam monthly  Annual mammogram  Calcium 1200 mg daily and vit D 2000 mg daily  Regular excercise               This note was electronically signed.    Earlene Castaneda PA-C   April 8, 2021

## 2021-04-15 DIAGNOSIS — Z12.72 VAGINAL PAP SMEAR: ICD-10-CM

## 2021-05-14 ENCOUNTER — HOSPITAL ENCOUNTER (OUTPATIENT)
Dept: MAMMOGRAPHY | Facility: HOSPITAL | Age: 61
Discharge: HOME | End: 2021-05-14
Admitting: PHYSICIAN ASSISTANT

## 2021-05-14 DIAGNOSIS — Z12.31 VISIT FOR SCREENING MAMMOGRAM: ICD-10-CM

## 2021-05-14 PROCEDURE — 77067 SCR MAMMO BI INCL CAD: CPT

## 2021-05-14 PROCEDURE — 77063 BREAST TOMOSYNTHESIS BI: CPT

## 2021-06-04 ENCOUNTER — HOSPITAL ENCOUNTER (OUTPATIENT)
Dept: ULTRASOUND IMAGING | Facility: HOSPITAL | Age: 61
Discharge: HOME | End: 2021-06-04
Admitting: PHYSICIAN ASSISTANT

## 2021-06-04 DIAGNOSIS — R92.8 ABNORMAL MAMMOGRAM: ICD-10-CM

## 2021-06-04 PROCEDURE — 76642 ULTRASOUND BREAST LIMITED: CPT

## 2022-05-05 RX ORDER — ESTRADIOL 0.03 MG/D
FILM, EXTENDED RELEASE TRANSDERMAL
Qty: 8 PATCH | Refills: 0 | Status: SHIPPED | OUTPATIENT
Start: 2022-05-05 | End: 2022-05-13 | Stop reason: SDUPTHER

## 2022-05-13 ENCOUNTER — TRANSCRIBE ORDERS (OUTPATIENT)
Dept: ADMINISTRATIVE | Facility: HOSPITAL | Age: 62
End: 2022-05-13

## 2022-05-13 ENCOUNTER — TELEPHONE (OUTPATIENT)
Dept: OBSTETRICS AND GYNECOLOGY | Facility: CLINIC | Age: 62
End: 2022-05-13

## 2022-05-13 DIAGNOSIS — Z12.31 VISIT FOR SCREENING MAMMOGRAM: Primary | ICD-10-CM

## 2022-05-13 RX ORDER — ESTRADIOL 0.03 MG/D
1 FILM, EXTENDED RELEASE TRANSDERMAL 2 TIMES WEEKLY
Qty: 8 PATCH | Refills: 2 | Status: SHIPPED | OUTPATIENT
Start: 2022-05-16 | End: 2022-07-14 | Stop reason: SDUPTHER

## 2022-05-13 NOTE — TELEPHONE ENCOUNTER
----- Message from Shania Carroll MA sent at 5/13/2022  1:17 PM EDT -----  Patient is requesting refill on her hormone patch until her Annual in July    [facility]     [facility]      Thank You

## 2022-07-14 ENCOUNTER — OFFICE VISIT (OUTPATIENT)
Dept: OBSTETRICS AND GYNECOLOGY | Facility: CLINIC | Age: 62
End: 2022-07-14

## 2022-07-14 VITALS
HEIGHT: 64 IN | WEIGHT: 191.8 LBS | DIASTOLIC BLOOD PRESSURE: 76 MMHG | SYSTOLIC BLOOD PRESSURE: 140 MMHG | BODY MASS INDEX: 32.74 KG/M2

## 2022-07-14 DIAGNOSIS — N81.11 CYSTOCELE, MIDLINE: ICD-10-CM

## 2022-07-14 DIAGNOSIS — Z79.890 CURRENT LONG-TERM USE OF POSTMENOPAUSAL HORMONE REPLACEMENT THERAPY: ICD-10-CM

## 2022-07-14 DIAGNOSIS — Z01.419 WELL WOMAN EXAM WITH ROUTINE GYNECOLOGICAL EXAM: Primary | ICD-10-CM

## 2022-07-14 DIAGNOSIS — N39.3 URINARY, INCONTINENCE, STRESS FEMALE: ICD-10-CM

## 2022-07-14 DIAGNOSIS — Z12.4 SCREENING FOR CERVICAL CANCER: ICD-10-CM

## 2022-07-14 PROCEDURE — 99396 PREV VISIT EST AGE 40-64: CPT | Performed by: PHYSICIAN ASSISTANT

## 2022-07-14 RX ORDER — ESTRADIOL 0.03 MG/D
1 FILM, EXTENDED RELEASE TRANSDERMAL 2 TIMES WEEKLY
Qty: 8 PATCH | Refills: 12 | Status: SHIPPED | OUTPATIENT
Start: 2022-07-14

## 2022-07-14 RX ORDER — ROSUVASTATIN CALCIUM 40 MG/1
40 TABLET, COATED ORAL DAILY
COMMUNITY
Start: 2022-06-13

## 2022-07-14 NOTE — PROGRESS NOTES
Subjective   Chief Complaint   Patient presents with   • Gynecologic Exam     Last pap done 21-WNL, MMG is scheduled, requesting a refill for Vivelle Dot, No complaints       Hailey Owens is a 62 y.o. year old  presenting to be seen for her annual gynecological exam.   She has screening mammogram scheduled for next week.  She has had previous hysterectomy and bilateral salpingo-oophorectomy.  She is requesting to stay on her 0.025 mg Vivelle-Dot estrogen patch  She experiences stress urine incontinence on occasion. No urgency or frequency. No nocturia      Past Medical History:   Diagnosis Date   • Anxiety    • Basal cell carcinoma    • Endometriosis     Hysterectomy   • High cholesterol    • History of Papanicolaou smear of cervix    • Ovarian cyst     Hysterectomy   • Polycystic ovary syndrome     Hysterectomy   • Urinary tract infection    • Varicella     childhood        Current Outpatient Medications:   •  citalopram (CeleXA) 20 MG tablet, Take 20 mg by mouth., Disp: , Rfl:   •  estradiol (VIVELLE-DOT) 0.025 MG/24HR patch, Place 1 patch on the skin as directed by provider 2 (Two) Times a Week., Disp: 8 patch, Rfl: 12  •  rosuvastatin (CRESTOR) 40 MG tablet, , Disp: , Rfl:    Allergies   Allergen Reactions   • Sulfa Antibiotics Hives      Past Surgical History:   Procedure Laterality Date   • BRAIN TUMOR EXCISION     • CATARACT EXTRACTION  2015 left eye    2022 rt. eye scheduled   • HYSTERECTOMY     • LAPAROSCOPIC ASSISTED VAGINAL HYSTERECTOMY SALPINGO OOPHORECTOMY     • PELVIC LAPAROSCOPY     • TONSILLECTOMY     • TUBAL ABDOMINAL LIGATION     • WISDOM TOOTH EXTRACTION        Social History     Socioeconomic History   • Marital status:    Tobacco Use   • Smoking status: Former Smoker     Packs/day: 0.00     Years: 10.00     Pack years: 0.00     Types: Cigarettes     Start date: 1979     Quit date: 1989     Years since quittin.5   • Smokeless tobacco: Never Used   • Tobacco  "comment: smoked 1 pack a day   Vaping Use   • Vaping Use: Never used   Substance and Sexual Activity   • Alcohol use: Yes     Alcohol/week: 2.0 standard drinks     Types: 2 Glasses of wine per week     Comment: 2   • Drug use: No   • Sexual activity: Yes     Partners: Male     Birth control/protection: Surgical      Family History   Problem Relation Age of Onset   • Brain cancer Father    • Stomach cancer Father    • Stomach cancer Mother    • Colon cancer Mother    • Hyperlipidemia Mother    • Hypertension Sister    • Diabetes Daughter    • Polycystic kidney disease Daughter    • Hypertension Daughter    • Vaginal cancer Maternal Grandmother    • Deep vein thrombosis Brother         behind right knee       Review of Systems   Constitutional: Negative for chills, diaphoresis and fever.   Gastrointestinal: Negative.    Genitourinary: Negative for difficulty urinating, dysuria and pelvic pain.           Objective   /76   Ht 162.6 cm (64\")   Wt 87 kg (191 lb 12.8 oz)   Breastfeeding No   BMI 32.92 kg/m²     Physical Exam  Constitutional:       Appearance: Normal appearance. She is well-developed and well-groomed.   Eyes:      General: Lids are normal.      Extraocular Movements: Extraocular movements intact.      Conjunctiva/sclera: Conjunctivae normal.   Chest:   Breasts: Breasts are symmetrical.      Right: No inverted nipple, mass, nipple discharge, skin change, tenderness or axillary adenopathy.      Left: No inverted nipple, mass, nipple discharge, skin change, tenderness or axillary adenopathy.       Abdominal:      General: There is no distension.      Palpations: Abdomen is soft.      Tenderness: There is no abdominal tenderness.   Genitourinary:     Labia:         Right: No rash, tenderness or lesion.         Left: No rash, tenderness or lesion.       Urethra: No prolapse, urethral pain, urethral swelling or urethral lesion.      Vagina: No signs of injury. No vaginal discharge or tenderness.      " Uterus: Absent.       Adnexa:         Right: No mass or tenderness.          Left: No mass or tenderness.        Rectum: No external hemorrhoid.      Comments: Grade 1 cystocele with coughing  Lymphadenopathy:      Upper Body:      Right upper body: No axillary adenopathy.      Left upper body: No axillary adenopathy.   Skin:     General: Skin is warm and dry.      Findings: No bruising or lesion.   Neurological:      General: No focal deficit present.      Mental Status: She is alert and oriented to person, place, and time.   Psychiatric:         Attention and Perception: Attention normal.         Mood and Affect: Mood normal.         Speech: Speech normal.         Behavior: Behavior is cooperative.            Result Review :                   Assessment and Plan  Diagnoses and all orders for this visit:    1. Well woman exam with routine gynecological exam (Primary)    2. Screening for cervical cancer  -     LIQUID-BASED PAP SMEAR, [facility] ([facility])    3. Urinary, incontinence, stress female    4. Cystocele, midline    5. Current long-term use of postmenopausal hormone replacement therapy    Other orders  -     estradiol (VIVELLE-DOT) 0.025 MG/24HR patch; Place 1 patch on the skin as directed by provider 2 (Two) Times a Week.  Dispense: 8 patch; Refill: 12      Patient Instructions   Self breast exam monthly  Annual mammogram  Calcium 1200 mg daily and vit D 2000 mg daily  Regular excercise   Encourage Kegels daily and avoidance of strenuous lifting             This note was electronically signed.    Earlene Castaneda PA-C   July 14, 2022

## 2022-07-14 NOTE — PATIENT INSTRUCTIONS
Self breast exam monthly  Annual mammogram  Calcium 1200 mg daily and vit D 2000 mg daily  Regular excercise   Encourage Kegels daily and avoidance of strenuous lifting

## 2022-07-20 LAB — REF LAB TEST METHOD: NORMAL

## 2022-07-21 ENCOUNTER — HOSPITAL ENCOUNTER (OUTPATIENT)
Dept: MAMMOGRAPHY | Facility: HOSPITAL | Age: 62
Discharge: HOME | End: 2022-07-21
Admitting: PHYSICIAN ASSISTANT

## 2022-07-21 DIAGNOSIS — Z12.31 VISIT FOR SCREENING MAMMOGRAM: ICD-10-CM

## 2022-07-21 PROCEDURE — 77063 BREAST TOMOSYNTHESIS BI: CPT

## 2022-07-21 PROCEDURE — 77067 SCR MAMMO BI INCL CAD: CPT

## 2022-07-22 DIAGNOSIS — R92.8 ABNORMAL MAMMOGRAM: Primary | ICD-10-CM

## 2022-07-22 NOTE — PROGRESS NOTES
Please inform patient her screening mammogram has returned showing abnormality in the left breast.  The radiologist feels that this is most likely a benign cyst but he has recommended an ultrasound to confirm.  I have placed an order for left breast ultrasound and she should be contacted within the next few days to schedule.

## 2022-08-10 ENCOUNTER — PREP FOR SURGERY (OUTPATIENT)
Dept: OTHER | Facility: HOSPITAL | Age: 62
End: 2022-08-10

## 2022-08-10 DIAGNOSIS — H25.11 NUCLEAR SCLEROTIC CATARACT OF RIGHT EYE: Primary | ICD-10-CM

## 2022-08-10 RX ORDER — SODIUM CHLORIDE 0.9 % (FLUSH) 0.9 %
10 SYRINGE (ML) INJECTION AS NEEDED
Status: CANCELLED | OUTPATIENT
Start: 2022-08-10

## 2022-08-10 RX ORDER — CYCLOPENTOLATE HYDROCHLORIDE 20 MG/ML
1 SOLUTION/ DROPS OPHTHALMIC
Status: CANCELLED | OUTPATIENT
Start: 2022-08-10 | End: 2022-08-10

## 2022-08-10 RX ORDER — PHENYLEPHRINE HCL 2.5 %
1 DROPS OPHTHALMIC (EYE)
Status: CANCELLED | OUTPATIENT
Start: 2022-08-10 | End: 2022-08-10

## 2022-08-10 RX ORDER — SODIUM CHLORIDE 0.9 % (FLUSH) 0.9 %
10 SYRINGE (ML) INJECTION EVERY 12 HOURS SCHEDULED
Status: CANCELLED | OUTPATIENT
Start: 2022-08-10

## 2022-08-10 RX ORDER — DIFLUPREDNATE OPHTHALMIC 0.5 MG/ML
1 EMULSION OPHTHALMIC SEE ADMIN INSTRUCTIONS
Status: CANCELLED | OUTPATIENT
Start: 2022-08-10

## 2022-08-10 RX ORDER — TETRACAINE HYDROCHLORIDE 5 MG/ML
1 SOLUTION OPHTHALMIC SEE ADMIN INSTRUCTIONS
Status: CANCELLED | OUTPATIENT
Start: 2022-08-10

## 2022-08-11 PROBLEM — H25.11 NUCLEAR SCLEROTIC CATARACT OF RIGHT EYE: Status: ACTIVE | Noted: 2022-08-11

## 2022-08-17 NOTE — PRE-PROCEDURE INSTRUCTIONS
PAT phone history completed with pt for upcoming procedure on 8/19/22, with Dr. Parks.      PAT PASS GIVEN/REVIEWED WITH PT.  VERBALIZED UNDERSTANDING OF THE FOLLOWING:  DO NOT EAT, DRINK, SMOKE, USE SMOKELESS TOBACCO OR CHEW GUM AFTER MIDNIGHT THE NIGHT BEFORE SURGERY.  THIS ALSO INCLUDES HARD CANDIES AND MINTS.    DO NOT SHAVE THE AREA TO BE OPERATED ON AT LEAST 48 HOURS PRIOR TO THE PROCEDURE.  DO NOT WEAR MAKE UP OR NAIL POLISH.  DO NOT LEAVE IN ANY PIERCING OR WEAR JEWELRY THE DAY OF SURGERY.      DO NOT USE ADHESIVES IF YOU WEAR DENTURES.    DO NOT WEAR EYE CONTACTS; BRING IN YOUR GLASSES.    ONLY TAKE MEDICATION THE MORNING OF YOUR PROCEDURE IF INSTRUCTED BY YOUR SURGEON WITH ENOUGH WATER TO SWALLOW THE MEDICATION.  IF YOUR SURGEON DID NOT SPECIFY WHICH MEDICATIONS TO TAKE, YOU WILL NEED TO CALL THEIR OFFICE FOR FURTHER INSTRUCTIONS AND DO AS THEY INSTRUCT.    LEAVE ANYTHING YOU CONSIDER VALUABLE AT HOME.    YOU WILL NEED TO ARRANGE FOR SOMEONE TO DRIVE YOU HOME AFTER SURGERY.  IT IS RECOMMENDED THAT YOU DO NOT DRIVE, WORK, DRINK ALCOHOL OR MAKE MAJOR DECISIONS FOR AT LEAST 24 HOURS AFTER YOUR PROCEDURE IS COMPLETE.      THE DAY OF YOUR PROCEDURE, BRING IN THE FOLLOWING IF APPLICABLE:   PICTURE ID AND INSURANCE/MEDICARE OR MEDICAID CARDS/ANY CO-PAY THAT MAY BE DUE   COPY OF ADVANCED DIRECTIVE/LIVING WILL/POWER OR    CPAP/BIPAP/INHALERS   SKIN PREP SHEET   YOUR PREADMISSION TESTING PASS (IF NOT A PHONE HISTORY)           COVID self-quarantine instructions reviewed with the pt.  Verbalized understanding.

## 2022-08-19 ENCOUNTER — HOSPITAL ENCOUNTER (OUTPATIENT)
Facility: HOSPITAL | Age: 62
Setting detail: HOSPITAL OUTPATIENT SURGERY
Discharge: HOME | End: 2022-08-19
Attending: OPHTHALMOLOGY | Admitting: OPHTHALMOLOGY

## 2022-08-19 ENCOUNTER — ANESTHESIA (OUTPATIENT)
Dept: PERIOP | Facility: HOSPITAL | Age: 62
End: 2022-08-19

## 2022-08-19 ENCOUNTER — ANESTHESIA EVENT (OUTPATIENT)
Dept: PERIOP | Facility: HOSPITAL | Age: 62
End: 2022-08-19

## 2022-08-19 VITALS
HEIGHT: 64 IN | RESPIRATION RATE: 16 BRPM | WEIGHT: 180 LBS | BODY MASS INDEX: 30.73 KG/M2 | TEMPERATURE: 98.3 F | SYSTOLIC BLOOD PRESSURE: 181 MMHG | DIASTOLIC BLOOD PRESSURE: 83 MMHG | OXYGEN SATURATION: 97 % | HEART RATE: 67 BPM

## 2022-08-19 DIAGNOSIS — H25.11 NUCLEAR SCLEROTIC CATARACT OF RIGHT EYE: ICD-10-CM

## 2022-08-19 PROCEDURE — 25010000002 EPINEPHRINE PER 0.1 MG: Performed by: OPHTHALMOLOGY

## 2022-08-19 PROCEDURE — V2632 POST CHMBR INTRAOCULAR LENS: HCPCS | Performed by: OPHTHALMOLOGY

## 2022-08-19 PROCEDURE — 25010000002 MIDAZOLAM PER 1MG: Performed by: NURSE ANESTHETIST, CERTIFIED REGISTERED

## 2022-08-19 DEVICE — IMPLANTABLE DEVICE: Type: IMPLANTABLE DEVICE | Site: POSTERIOR CHAMBER | Status: FUNCTIONAL

## 2022-08-19 RX ORDER — CYCLOPENTOLATE HYDROCHLORIDE 20 MG/ML
1 SOLUTION/ DROPS OPHTHALMIC
Status: COMPLETED | OUTPATIENT
Start: 2022-08-19 | End: 2022-08-19

## 2022-08-19 RX ORDER — TETRACAINE HYDROCHLORIDE 5 MG/ML
SOLUTION OPHTHALMIC AS NEEDED
Status: DISCONTINUED | OUTPATIENT
Start: 2022-08-19 | End: 2022-08-19 | Stop reason: HOSPADM

## 2022-08-19 RX ORDER — LIDOCAINE HYDROCHLORIDE 20 MG/ML
INJECTION, SOLUTION EPIDURAL; INFILTRATION; INTRACAUDAL; PERINEURAL AS NEEDED
Status: DISCONTINUED | OUTPATIENT
Start: 2022-08-19 | End: 2022-08-19 | Stop reason: HOSPADM

## 2022-08-19 RX ORDER — SODIUM CHLORIDE, SODIUM LACTATE, POTASSIUM CHLORIDE, CALCIUM CHLORIDE 600; 310; 30; 20 MG/100ML; MG/100ML; MG/100ML; MG/100ML
1000 INJECTION, SOLUTION INTRAVENOUS CONTINUOUS
Status: DISCONTINUED | OUTPATIENT
Start: 2022-08-19 | End: 2022-08-19 | Stop reason: HOSPADM

## 2022-08-19 RX ORDER — DIFLUPREDNATE OPHTHALMIC 0.5 MG/ML
EMULSION OPHTHALMIC AS NEEDED
Status: DISCONTINUED | OUTPATIENT
Start: 2022-08-19 | End: 2022-08-19 | Stop reason: HOSPADM

## 2022-08-19 RX ORDER — TETRACAINE HYDROCHLORIDE 5 MG/ML
1 SOLUTION OPHTHALMIC SEE ADMIN INSTRUCTIONS
Status: DISCONTINUED | OUTPATIENT
Start: 2022-08-19 | End: 2022-08-19 | Stop reason: HOSPADM

## 2022-08-19 RX ORDER — DIFLUPREDNATE OPHTHALMIC 0.5 MG/ML
1 EMULSION OPHTHALMIC SEE ADMIN INSTRUCTIONS
Status: DISCONTINUED | OUTPATIENT
Start: 2022-08-19 | End: 2022-08-19 | Stop reason: HOSPADM

## 2022-08-19 RX ORDER — SODIUM CHLORIDE 0.9 % (FLUSH) 0.9 %
10 SYRINGE (ML) INJECTION EVERY 12 HOURS SCHEDULED
Status: DISCONTINUED | OUTPATIENT
Start: 2022-08-19 | End: 2022-08-19 | Stop reason: HOSPADM

## 2022-08-19 RX ORDER — SODIUM CHLORIDE 0.9 % (FLUSH) 0.9 %
10 SYRINGE (ML) INJECTION AS NEEDED
Status: DISCONTINUED | OUTPATIENT
Start: 2022-08-19 | End: 2022-08-19 | Stop reason: HOSPADM

## 2022-08-19 RX ORDER — MIDAZOLAM HYDROCHLORIDE 2 MG/2ML
INJECTION, SOLUTION INTRAMUSCULAR; INTRAVENOUS AS NEEDED
Status: DISCONTINUED | OUTPATIENT
Start: 2022-08-19 | End: 2022-08-19 | Stop reason: SURG

## 2022-08-19 RX ORDER — DIFLUPREDNATE OPHTHALMIC 0.5 MG/ML
EMULSION OPHTHALMIC
Start: 2022-08-19

## 2022-08-19 RX ORDER — BALANCED SALT SOLUTION 6.4; .75; .48; .3; 3.9; 1.7 MG/ML; MG/ML; MG/ML; MG/ML; MG/ML; MG/ML
SOLUTION OPHTHALMIC AS NEEDED
Status: DISCONTINUED | OUTPATIENT
Start: 2022-08-19 | End: 2022-08-19 | Stop reason: HOSPADM

## 2022-08-19 RX ORDER — PHENYLEPHRINE HCL 2.5 %
1 DROPS OPHTHALMIC (EYE)
Status: COMPLETED | OUTPATIENT
Start: 2022-08-19 | End: 2022-08-19

## 2022-08-19 RX ADMIN — CYCLOPENTOLATE HYDROCHLORIDE 1 DROP: 20 SOLUTION/ DROPS OPHTHALMIC at 09:35

## 2022-08-19 RX ADMIN — MIDAZOLAM HYDROCHLORIDE 1 MG: 1 INJECTION, SOLUTION INTRAMUSCULAR; INTRAVENOUS at 10:54

## 2022-08-19 RX ADMIN — PHENYLEPHRINE HYDROCHLORIDE 1 DROP: 25 SOLUTION/ DROPS OPHTHALMIC at 09:25

## 2022-08-19 RX ADMIN — PHENYLEPHRINE HYDROCHLORIDE 1 DROP: 25 SOLUTION/ DROPS OPHTHALMIC at 09:35

## 2022-08-19 RX ADMIN — MIDAZOLAM HYDROCHLORIDE 1 MG: 1 INJECTION, SOLUTION INTRAMUSCULAR; INTRAVENOUS at 10:45

## 2022-08-19 RX ADMIN — TETRACAINE HYDROCHLORIDE 1 DROP: 5 SOLUTION OPHTHALMIC at 09:23

## 2022-08-19 RX ADMIN — CYCLOPENTOLATE HYDROCHLORIDE 1 DROP: 20 SOLUTION/ DROPS OPHTHALMIC at 09:30

## 2022-08-19 RX ADMIN — TETRACAINE HYDROCHLORIDE 1 DROP: 5 SOLUTION OPHTHALMIC at 09:24

## 2022-08-19 RX ADMIN — PHENYLEPHRINE HYDROCHLORIDE 1 DROP: 25 SOLUTION/ DROPS OPHTHALMIC at 09:30

## 2022-08-19 RX ADMIN — SODIUM CHLORIDE, POTASSIUM CHLORIDE, SODIUM LACTATE AND CALCIUM CHLORIDE: 600; 310; 30; 20 INJECTION, SOLUTION INTRAVENOUS at 11:01

## 2022-08-19 RX ADMIN — SODIUM CHLORIDE, POTASSIUM CHLORIDE, SODIUM LACTATE AND CALCIUM CHLORIDE 1000 ML: 600; 310; 30; 20 INJECTION, SOLUTION INTRAVENOUS at 09:29

## 2022-08-19 RX ADMIN — CYCLOPENTOLATE HYDROCHLORIDE 1 DROP: 20 SOLUTION/ DROPS OPHTHALMIC at 09:25

## 2022-08-19 NOTE — OP NOTE
OPERATIVE NOTE    Date of Procedure: 8/19/2022  Patient Name: Hailey Owens  Patient MRN: [id number]  YOB: 1960     Preoperative Diagnosis: Right nuclear sclerotic cataract.     Postoperative Diagnosis: Right nuclear sclerotic cataract.     Procedure Performed: Phacoemulsification with implantation of a  foldable posterior chamber intraocular lens, Right eye.     Surgeon: Puneet Parks MD     Anesthesia:  Monitored Anesthesia Care (MAC)      Brief History and Indication: The patient presents with a history of past progressive loss of vision.  Patient was diagnosed with cataract and requests removal for increased ability to read and see.     Operation Description: The patient was taken to the OR and prepped and draped in the usual sterile ophthalmic fashion. A lid speculum was placed in the eye.  A 0.8 mm blade was then used to make a stab incision two o’clock hours from the intended temporal clear cornea groove. The anterior chamber was then inflated with a Viscoelastic. A metal microkeratome blade was then used to enter the anterior chamber at the temporal clear cornea site. A three level tunnel incision was made. A curvilinear capsulorrhexis was then performed with a bent cystotome needle and capsulorrhexis forceps.  BSS on a 30 gauge bent cannula was used to hydro-dissect the lens. Good fluid waves were noted. Phacoemulsification was then used to remove nuclear material without complications. The residual cortical and lenticular material was then removed with irrigation and aspiration. Viscoelastics were then used to inflate the bag in a soft shell technique. A PCIOL was injected into the bag. Post-implantation, there were no rents or tears in the bag and the lens was noted to be stable and centered. The residual Viscoelastic was then removed with irrigation and aspiration.  The wound was checked and found to be without leaks. One drop of a Prednisilone was placed in the eye.     target  -2.0    Implant Information:   Implant Name Type Inv. Item Serial No.  Lot No. LRB No. Used Action   LENS ACRYSOF IQ 6X13MM [id number] 10.5 - [id number] - [id number] Implant LENS ACRYSOF IQ 6X13MM SN60WF 10.5 [id number] JUAN CARLOS  Right 1 Implanted       Complications: None    Estimated Blood Loss:  Less than 1 cc.      Discharge and Condition  The patient was transported to same day surgery in excellent condition and scheduled for follow-up appointment. The patient was given instructions on use of eye drops for the operative eye and was specifically instructed to call for any concerns.    Puneet Parks MD  8/19/2022

## 2022-08-19 NOTE — DISCHARGE INSTRUCTIONS
Post Operative Cataract Instructions     Right Eye  POST OPERATIVE INSTRUCTIONS  You have received anesthesia today. DO NOT drive, drink alcohol, sign legal documents.   After surgery, your eye will not hurt. It may feel scratchy, sticky or uncomfortable. Your eye will be sensitive to light.  Most people see better 1-3 days after the procedure, but it could take 3 weeks to get the full benefits and reach your visual potential. If your vision is blurry for a few days it is normal, and means you may have swelling outside or inside the eye. For some patients, a bubble is placed and there will be blurriness.   You should receive a post-op kit with tape and an eye shield. Wear the shield for the first 3 nights after surgery to keep you from rubbing the eye.  Most people are able to return to their normal routine 1-3 days after surgery, however, due to the brain adjusting to your new vision you may have trouble judging distances and want to be careful when driving and going up and downstairs.   You can shower and wash your hair the day after surgery. Keep water, shampoo, hair spray and shaving lotion out of the eye, especially for the first week.  During the first week, you should AVOID:   Rubbing or putting pressure on your eye.  Eye make-up, face cream or lotion, hair coloring or perms  Strenuous activities, such as running or lifting weights, as to avoid sweat from getting in the eye. Avoid swimming, hot tubs, fumes or gumaro conditions.   Keep your head above your heart (no hanging the head down for periods of time).    Some discomfort and blurred vision after surgery are normal, but if you have any unusual pain, swelling, bleeding or sudden decrease in vision, contact our office immediately.     Emergency assistance is available at any time by calling:    Dr.Mark Bronwyn Barnhart  [phone]    If unable to reach call [facility] @  [phone]    You have been prescribed an eye drop to use after surgery, please follow these instructions:    Durezol Shake well before use    USE 1 DROP 4 (FOUR) TIMES A DAY FOR 1 WEEK, WEEK 2: USE 3 (THREE) TIMES A DAY FOR 1 WEEK, WEEK 3: USE 2 (TWO) TIMES A DAY FOR 1 WEEK, WEEK 4: USE 1 (ONE) TIME A DAY FOR 1 WEEK THEN STOP.    PLACE A CHILANGO IN THE DAY COLUMN EACH TIME YOU USE TO KEEP ON SCHEDULE    WEEK 1-USE 4  (FOUR) TIMES A DAY DAY 1   DAY 2 DAY 3 DAY 4 DAY 5 DAY 6 DAY 7     WEEK 2-USE 3 (THREE)  TIMES A DAY DAY 1 DAY 2 DAY 3 DAY 4 DAY 5 DAY 6 DAY 7     WEEK 3-USE 2 (TWO) TIMES A DAY DAY 1 DAY 2 DAY 3 DAY 4 DAY 5 DAY 6 DAY 7     WEEK 4-USE 1 (ONE)TIME A DAY DAY 1 DAY 2 DAY 3 DAY 4 DAY 5 DAY 6 DAY 7       Please follow all post op instructions and follow up appointment time from your physician's office included in your discharge packet.  .  No pushing, pulling, tugging,  heavy lifting, or strenuous activity.  No major decision making, driving, or drinking alcoholic beverages for 24 hours. ( due to the medications you have  received)  Always use good hand hygiene/washing techniques.  NO driving while taking pain medications.    * if you have an incision:  Check your incision area every day for signs of infection.   Check for:  * more redness, swelling, or pain  *more fluid or blood  *warmth  *pus or bad smell T    To assist you in voiding:  Drink plenty of fluids  Listen to running water while attempting to void.    If you are unable to urinate and you have an uncomfortable urge to void or it has been   6 hours since you were discharged, return to the Emergency Room

## 2022-08-19 NOTE — ANESTHESIA POSTPROCEDURE EVALUATION
Patient: Hailey Owens    Procedure Summary     Date: 08/19/22 Room / Location: [facility] OR 1 /  [facility]    Anesthesia Start: 1042 Anesthesia Stop: 1102    Procedure: CATARACT PHACO EXTRACTION WITH INTRAOCULAR LENS IMPLANT RIGHT (Right Eye) Diagnosis:       Nuclear sclerotic cataract of right eye      (Nuclear sclerotic cataract of right eye [H25.11])    Surgeons: Puneet Parks MD Provider: Cain Gomez CRNA    Anesthesia Type: MAC ASA Status: 3          Anesthesia Type: MAC    Vitals  No vitals data found for the desired time range.          Post Anesthesia Care and Evaluation    Patient location during evaluation: bedside  Patient participation: complete - patient participated  Level of consciousness: awake  Pain score: 0  Pain management: adequate    Airway patency: patent  Anesthetic complications: No anesthetic complications  PONV Status: controlled  Cardiovascular status: acceptable and stable  Respiratory status: acceptable and room air  Hydration status: acceptable    Comments: See nursing documentation for post op vital signs

## 2022-08-19 NOTE — H&P
[facility] [place]         History and Physical    Patient Name: Hailey Owens  MRN: [id number]  : 1960  Gender: female     HPI: Patient complaint of PPLOV Right eye diagnosed with cataract. Patient requests PHACO PCIOL for Increase of VA/ADL.    History:    Past Medical History:   Diagnosis Date   • Anxiety    • Basal cell carcinoma    • Elevated cholesterol    • Endometriosis     Hysterectomy   • High cholesterol    • History of Papanicolaou smear of cervix    • Ovarian cyst     Hysterectomy   • Polycystic ovary syndrome     Hysterectomy   • Urinary tract infection    • Varicella     childhood       Past Surgical History:   Procedure Laterality Date   • BRAIN TUMOR EXCISION     • CATARACT EXTRACTION  2015 left eye    2022 rt. eye scheduled   • HYSTERECTOMY     • LAPAROSCOPIC ASSISTED VAGINAL HYSTERECTOMY SALPINGO OOPHORECTOMY     • PELVIC LAPAROSCOPY     • TONSILLECTOMY     • TUBAL ABDOMINAL LIGATION     • WISDOM TOOTH EXTRACTION         Social History     Socioeconomic History   • Marital status:    Tobacco Use   • Smoking status: Former Smoker     Packs/day: 0.00     Years: 10.00     Pack years: 0.00     Types: Cigarettes     Start date: 1979     Quit date: 1989     Years since quittin.6   • Smokeless tobacco: Never Used   • Tobacco comment: smoked 1 pack a day   Vaping Use   • Vaping Use: Never used   Substance and Sexual Activity   • Alcohol use: Yes     Alcohol/week: 2.0 standard drinks     Types: 2 Glasses of wine per week     Comment: Occ   • Drug use: No   • Sexual activity: Defer       Family History   Problem Relation Age of Onset   • Brain cancer Father    • Stomach cancer Father    • Stomach cancer Mother    • Colon cancer Mother    • Hyperlipidemia Mother    • Hypertension Sister    • Diabetes Daughter    • Polycystic kidney disease Daughter    • Hypertension Daughter    • Vaginal cancer Maternal Grandmother    • Deep vein thrombosis Brother          behind right knee       Prior to Admission Medications:  Medications Prior to Admission   Medication Sig Dispense Refill Last Dose   • citalopram (CeleXA) 20 MG tablet Take 20 mg by mouth Daily.   8/19/2022 at 0630   • estradiol (VIVELLE-DOT) 0.025 MG/24HR patch Place 1 patch on the skin as directed by provider 2 (Two) Times a Week. 8 patch 12 8/18/2022 at Unknown time   • rosuvastatin (CRESTOR) 40 MG tablet Take 40 mg by mouth Daily.   8/19/2022 at 0630       Allergies:  Allergies   Allergen Reactions   • Sulfa Antibiotics Hives        Vitals: Temp:  [97.3 °F (36.3 °C)] 97.3 °F (36.3 °C)  Heart Rate:  [62] 62  Resp:  [16] 16  BP: (155)/(75) 155/75    Review of Systems:   Within Normal Limits Abnormal   HEENT [x]    []     Cardiovascular [x]   []     Gastrointestinal [x]   []     Genitourinary [x]   []     Neurologic [x]   []     Pulmonary [x]   []       Physical Exam:   Within Normal Limits Abnormal   HEENT [x]    []     Heart [x]   []     Lungs [x]   []     Abdomen [x]   []     Extremities [x]   []       Impression: Right nuclear sclerotic cataract.     Plan: CATARACT PHACO EXTRACTION WITH INTRAOCULAR LENS IMPLANT RIGHT (Right)     Puneet Parks MD  8/19/2022

## 2022-08-19 NOTE — ANESTHESIA PREPROCEDURE EVALUATION
Anesthesia Evaluation     Patient summary reviewed and Nursing notes reviewed   NPO Solid Status: > 8 hours  NPO Liquid Status: > 8 hours           Airway   Mallampati: II  TM distance: >3 FB  Neck ROM: full  Possible difficult intubation  Dental      Pulmonary    Cardiovascular     (+) hyperlipidemia,       Neuro/Psych  (+) psychiatric history,    GI/Hepatic/Renal/Endo      Musculoskeletal     Abdominal    Substance History      OB/GYN          Other      history of cancer remission    ROS/Med Hx Other: Pituitary neoplasm  Otalgia of right ear  Neuritis  Nuclear sclerotic cataract of right eye                      Anesthesia Plan    ASA 3     MAC     intravenous induction     Anesthetic plan, risks, benefits, and alternatives have been provided, discussed and informed consent has been obtained with: patient.  Pre-procedure education provided  Plan discussed with CRNA.        CODE STATUS:

## 2022-08-26 ENCOUNTER — HOSPITAL ENCOUNTER (OUTPATIENT)
Dept: ULTRASOUND IMAGING | Facility: HOSPITAL | Age: 62
Discharge: HOME | End: 2022-08-26
Admitting: PHYSICIAN ASSISTANT

## 2022-08-26 DIAGNOSIS — R92.8 ABNORMAL MAMMOGRAM: ICD-10-CM

## 2022-08-26 PROCEDURE — 76642 ULTRASOUND BREAST LIMITED: CPT

## 2022-08-27 NOTE — PROGRESS NOTES
Please inform patient her left breast ultrasound has returned showing  areas in the left breast to be cysts.  There were no suspicious features seen.  The radiologist recommends follow-up screening mammogram in 1 year.

## 2023-09-29 ENCOUNTER — TRANSCRIBE ORDERS (OUTPATIENT)
Dept: ADMINISTRATIVE | Facility: HOSPITAL | Age: 63
End: 2023-09-29
Payer: COMMERCIAL

## 2023-09-29 DIAGNOSIS — Z12.31 SCREENING MAMMOGRAM FOR BREAST CANCER: Primary | ICD-10-CM

## 2023-12-08 ENCOUNTER — OFFICE VISIT (OUTPATIENT)
Dept: OBSTETRICS AND GYNECOLOGY | Facility: CLINIC | Age: 63
End: 2023-12-08
Payer: COMMERCIAL

## 2023-12-08 VITALS
BODY MASS INDEX: 32.88 KG/M2 | HEIGHT: 64 IN | SYSTOLIC BLOOD PRESSURE: 148 MMHG | WEIGHT: 192.6 LBS | DIASTOLIC BLOOD PRESSURE: 82 MMHG

## 2023-12-08 DIAGNOSIS — Z01.419 ROUTINE GYNECOLOGICAL EXAMINATION: Primary | ICD-10-CM

## 2023-12-08 DIAGNOSIS — N39.3 SUI (STRESS URINARY INCONTINENCE, FEMALE): ICD-10-CM

## 2023-12-08 DIAGNOSIS — Z79.890 CURRENT LONG-TERM USE OF POSTMENOPAUSAL HORMONE REPLACEMENT THERAPY: ICD-10-CM

## 2023-12-08 DIAGNOSIS — Z12.4 SCREENING FOR CERVICAL CANCER: ICD-10-CM

## 2023-12-08 RX ORDER — ESTRADIOL 0.03 MG/D
1 FILM, EXTENDED RELEASE TRANSDERMAL 2 TIMES WEEKLY
Qty: 8 PATCH | Refills: 12 | Status: SHIPPED | OUTPATIENT
Start: 2023-12-11

## 2023-12-08 RX ORDER — ESTRADIOL 0.1 MG/G
CREAM VAGINAL
Qty: 42.5 G | Refills: 4 | Status: SHIPPED | OUTPATIENT
Start: 2023-12-08

## 2023-12-08 NOTE — PATIENT INSTRUCTIONS
Self breast exam monthly  Annual mammogram  Calcium 1200 mg daily and vit D 2000 mg daily  Regular excercise   Refer urology possible [facility]

## 2023-12-08 NOTE — PROGRESS NOTES
Subjective   Chief Complaint   Patient presents with    Gynecologic Exam     Yearly exam and pap smear       Hailey Owens is a 63 y.o. year old  presenting to be seen for her annual gynecological exam.   She desires to continue low-dose Vivelle-Dot.  She has a mammogram scheduled for January  She has had issues with stress incontinence for quite some time but this has become worse over the last several months.  has occasional frequency and urgency but this is not a big issue for her.  wears pads daily due to the incontinence.  No previous UTI's  Previous hysterectomy and bilateral salpingo-oophorectomy.      Past Medical History:   Diagnosis Date    Anxiety     Basal cell carcinoma     Elevated cholesterol     Endometriosis     Hysterectomy    High cholesterol     History of Papanicolaou smear of cervix 2015    Ovarian cyst     Hysterectomy    Polycystic ovary syndrome     Hysterectomy    Urinary tract infection     Varicella     childhood        Current Outpatient Medications:     citalopram (CeleXA) 20 MG tablet, Take 1 tablet by mouth Daily., Disp: , Rfl:     [START ON 2023] estradiol (VIVELLE-DOT) 0.025 MG/24HR patch, Place 1 patch on the skin as directed by provider 2 (Two) Times a Week., Disp: 8 patch, Rfl: 12    rosuvastatin (CRESTOR) 40 MG tablet, Take 1 tablet by mouth Daily., Disp: , Rfl:     estradiol (ESTRACE VAGINAL) 0.1 MG/GM vaginal cream, Insert 1 gm intravaginally 2 times each week, Disp: 42.5 g, Rfl: 4   Allergies   Allergen Reactions    Sulfa Antibiotics Hives      Past Surgical History:   Procedure Laterality Date    BRAIN TUMOR EXCISION      CATARACT EXTRACTION  2015 left eye    2022 rt. eye scheduled    CATARACT EXTRACTION W/ INTRAOCULAR LENS IMPLANT Right 2022    Procedure: CATARACT PHACO EXTRACTION WITH INTRAOCULAR LENS IMPLANT RIGHT;  Surgeon: Puneet Parks MD;  Location: [facility];  Service: Ophthalmology;  Laterality: Right;    HYSTERECTOMY      LAPAROSCOPIC  "ASSISTED VAGINAL HYSTERECTOMY SALPINGO OOPHORECTOMY      PELVIC LAPAROSCOPY      TONSILLECTOMY      TUBAL ABDOMINAL LIGATION      WISDOM TOOTH EXTRACTION        Social History     Socioeconomic History    Marital status:    Tobacco Use    Smoking status: Former     Packs/day: 0.00     Years: 10.00     Additional pack years: 0.00     Total pack years: 0.00     Types: Cigarettes     Start date: 1979     Quit date: 1989     Years since quittin.9    Smokeless tobacco: Never    Tobacco comments:     smoked 1 pack a day   Vaping Use    Vaping Use: Never used   Substance and Sexual Activity    Alcohol use: Yes     Alcohol/week: 2.0 standard drinks of alcohol     Types: 2 Glasses of wine per week     Comment: Occ    Drug use: No    Sexual activity: Yes     Partners: Male     Birth control/protection: Hysterectomy     Comment:       Family History   Problem Relation Age of Onset    Brain cancer Father     Stomach cancer Father     Stomach cancer Mother     Colon cancer Mother         Stomach and colon cancer    Hyperlipidemia Mother     Hypertension Sister     Diabetes Daughter             Polycystic kidney disease Daughter     Hypertension Daughter         Deseased    Vaginal cancer Maternal Grandmother     Deep vein thrombosis Brother         behind right knee       Review of Systems   Constitutional:  Negative for chills, diaphoresis and fever.   Gastrointestinal:  Negative for constipation, diarrhea, nausea and vomiting.   Genitourinary:  Positive for enuresis. Negative for difficulty urinating, dyspareunia, dysuria, hematuria, pelvic pain and vaginal bleeding.           Objective   /82   Ht 162.6 cm (64\")   Wt 87.4 kg (192 lb 9.6 oz)   BMI 33.06 kg/m²     Physical Exam  Exam conducted with a chaperone present.   Constitutional:       Appearance: Normal appearance. She is well-developed and well-groomed. She is obese.   Eyes:      General: Lids are normal.      Extraocular " Movements: Extraocular movements intact.      Conjunctiva/sclera: Conjunctivae normal.   Chest:   Breasts:     Breasts are symmetrical.      Right: No inverted nipple, mass, nipple discharge, skin change or tenderness.      Left: No inverted nipple, mass, nipple discharge, skin change or tenderness.   Abdominal:      General: There is no distension.      Tenderness: There is no abdominal tenderness.   Genitourinary:     Labia:         Right: No rash, tenderness or lesion.         Left: No rash, tenderness or lesion.       Urethra: No prolapse, urethral pain, urethral swelling or urethral lesion.      Vagina: No vaginal discharge, tenderness, lesions or prolapsed vaginal walls.      Uterus: Absent.       Adnexa:         Right: No mass or tenderness.          Left: No mass or tenderness.        Comments: Mild laxity of vaginal muscles with cough/strain however no significant cystocele or rectocele appreciated. Urine leakage observed with cough   Lymphadenopathy:      Upper Body:      Right upper body: No axillary adenopathy.      Left upper body: No axillary adenopathy.   Neurological:      General: No focal deficit present.      Mental Status: She is alert and oriented to person, place, and time.   Psychiatric:         Attention and Perception: Attention normal.         Mood and Affect: Mood normal.         Speech: Speech normal.         Behavior: Behavior is cooperative.            Result Review :                   Assessment and Plan  Diagnoses and all orders for this visit:    1. Routine gynecological examination (Primary)    2. Screening for cervical cancer  -     LIQUID-BASED PAP SMEAR WITH HPV GENOTYPING REGARDLESS OF INTERPRETATION (ANUSHA,COR,MAD)    3. EDITH (stress urinary incontinence, female)  -     Ambulatory Referral to Urology    4. Current long-term use of postmenopausal hormone replacement therapy    Other orders  -     estradiol (VIVELLE-DOT) 0.025 MG/24HR patch; Place 1 patch on the skin as directed by  provider 2 (Two) Times a Week.  Dispense: 8 patch; Refill: 12  -     estradiol (ESTRACE VAGINAL) 0.1 MG/GM vaginal cream; Insert 1 gm intravaginally 2 times each week  Dispense: 42.5 g; Refill: 4      Patient Instructions   Self breast exam monthly  Annual mammogram  Calcium 1200 mg daily and vit D 2000 mg daily  Regular excercise   Refer urology possible [facility]           This note was electronically signed.    Earlene Castaneda PA-C   December 8, 2023

## 2023-12-12 LAB — REF LAB TEST METHOD: NORMAL

## 2024-01-17 ENCOUNTER — HOSPITAL ENCOUNTER (OUTPATIENT)
Dept: MAMMOGRAPHY | Facility: HOSPITAL | Age: 64
Discharge: HOME | End: 2024-01-17
Admitting: OBSTETRICS & GYNECOLOGY
Payer: MEDICARE

## 2024-01-17 DIAGNOSIS — Z12.31 SCREENING MAMMOGRAM FOR BREAST CANCER: ICD-10-CM

## 2024-01-17 PROCEDURE — 77063 BREAST TOMOSYNTHESIS BI: CPT

## 2024-01-17 PROCEDURE — 77067 SCR MAMMO BI INCL CAD: CPT

## 2024-01-31 ENCOUNTER — OFFICE VISIT (OUTPATIENT)
Dept: UROLOGY | Facility: CLINIC | Age: 64
End: 2024-01-31
Payer: MEDICARE

## 2024-01-31 ENCOUNTER — PATIENT ROUNDING (BHMG ONLY) (OUTPATIENT)
Dept: UROLOGY | Facility: CLINIC | Age: 64
End: 2024-01-31
Payer: MEDICARE

## 2024-01-31 VITALS
DIASTOLIC BLOOD PRESSURE: 84 MMHG | HEIGHT: 64 IN | SYSTOLIC BLOOD PRESSURE: 118 MMHG | TEMPERATURE: 97 F | BODY MASS INDEX: 32.78 KG/M2 | WEIGHT: 192 LBS

## 2024-01-31 DIAGNOSIS — N39.3 SUI (STRESS URINARY INCONTINENCE, FEMALE): Primary | ICD-10-CM

## 2024-01-31 PROBLEM — E78.5 HYPERLIPIDEMIA: Status: ACTIVE | Noted: 2024-01-31

## 2024-01-31 PROBLEM — F41.1 ANXIETY STATE: Status: ACTIVE | Noted: 2024-01-31

## 2024-01-31 LAB
BILIRUB BLD-MCNC: NEGATIVE MG/DL
CLARITY, POC: CLEAR
COLOR UR: YELLOW
EXPIRATION DATE: ABNORMAL
GLUCOSE UR STRIP-MCNC: NEGATIVE MG/DL
KETONES UR QL: NEGATIVE
LEUKOCYTE EST, POC: NEGATIVE
Lab: ABNORMAL
NITRITE UR-MCNC: NEGATIVE MG/ML
PH UR: 6 [PH] (ref 5–8)
PROT UR STRIP-MCNC: ABNORMAL MG/DL
RBC # UR STRIP: ABNORMAL /UL
SP GR UR: 1.01 (ref 1–1.03)
UROBILINOGEN UR QL: NORMAL

## 2024-01-31 RX ORDER — ATORVASTATIN CALCIUM 20 MG/1
20 TABLET, FILM COATED ORAL DAILY
COMMUNITY
End: 2024-01-31

## 2024-01-31 NOTE — PROGRESS NOTES
Chief Complaint  Chief Complaint   Patient presents with    Urinary Incontinence     EDITH          HPI  Ms. Owens is a 64 y.o. female presents with complaint of urinary incontinence for 6 years    Has not previously seen urology.    Pt has primarily Stress urinary incontinence.     Severity: Pt uses 2 pads a day and has tried tried Kegel in the past  Associated Sx: Pt has no h/o daytime frequency, urgency and nocturia     No h/o poor stream, straining, hesitancy or incomplete voiding     No h/o recurrent UTI, hematuria, nephrolithiasis    No vaginal discharge or bleeding  No sensation of POP  No        Fecal incontinence  No Post-void dribbling  No Dyspareunia   No Constipation  1 Vaginal deliveries    Past Medical History  Past Medical History:   Diagnosis Date    Anxiety     Basal cell carcinoma     Elevated cholesterol     Endometriosis     Hysterectomy    High cholesterol     History of Papanicolaou smear of cervix 2015    Ovarian cyst     Hysterectomy    Polycystic ovary syndrome     Hysterectomy    Urinary tract infection     Varicella     childhood       Past Surgical History  Past Surgical History:   Procedure Laterality Date    BRAIN TUMOR EXCISION      CATARACT EXTRACTION  2015 left eye    8/19/2022 rt. eye scheduled    CATARACT EXTRACTION W/ INTRAOCULAR LENS IMPLANT Right 08/19/2022    Procedure: CATARACT PHACO EXTRACTION WITH INTRAOCULAR LENS IMPLANT RIGHT;  Surgeon: Puneet Parks MD;  Location: [facility];  Service: Ophthalmology;  Laterality: Right;    HYSTERECTOMY      LAPAROSCOPIC ASSISTED VAGINAL HYSTERECTOMY SALPINGO OOPHORECTOMY      OOPHORECTOMY      PELVIC LAPAROSCOPY      TONSILLECTOMY      TUBAL ABDOMINAL LIGATION      WISDOM TOOTH EXTRACTION         Medications  Current Outpatient Medications   Medication Sig Dispense Refill    citalopram (CeleXA) 20 MG tablet Take 1 tablet by mouth Daily.      estradiol (ESTRACE VAGINAL) 0.1 MG/GM vaginal cream Insert 1 gm intravaginally 2 times each week  "42.5 g 4    estradiol (VIVELLE-DOT) 0.025 MG/24HR patch Place 1 patch on the skin as directed by provider 2 (Two) Times a Week. 8 patch 12    rosuvastatin (CRESTOR) 40 MG tablet Take 1 tablet by mouth Daily.       No current facility-administered medications for this visit.       Allergies  Allergies   Allergen Reactions    Sulfa Antibiotics Hives and Other (See Comments)       Social History  Social History     Socioeconomic History    Marital status:    Tobacco Use    Smoking status: Former     Packs/day: 0.00     Years: 10.00     Additional pack years: 0.00     Total pack years: 0.00     Types: Cigarettes     Start date: 1979     Quit date: 1989     Years since quittin.1    Smokeless tobacco: Never    Tobacco comments:     smoked 1 pack a day   Vaping Use    Vaping Use: Never used   Substance and Sexual Activity    Alcohol use: Yes     Alcohol/week: 2.0 standard drinks of alcohol     Types: 2 Glasses of wine per week     Comment: Occ    Drug use: No    Sexual activity: Yes     Partners: Male     Birth control/protection: Hysterectomy     Comment:        Family History  Family History   Problem Relation Age of Onset    Stomach cancer Mother     Colon cancer Mother         Stomach and colon cancer    Hyperlipidemia Mother     Brain cancer Father     Stomach cancer Father     Hypertension Sister     Deep vein thrombosis Brother         behind right knee    Diabetes Daughter             Polycystic kidney disease Daughter     Hypertension Daughter         Deseased    Vaginal cancer Maternal Grandmother     Breast cancer Maternal Aunt          Physical Exam  Visit Vitals  /84   Temp 97 °F (36.1 °C)   Ht 162.6 cm (64\")   Wt 87.1 kg (192 lb)   BMI 32.96 kg/m²     Physical exam was notable for grade 1 cystocele, minimal urethral hypermobility, visualized expression of urine with coughing.  Mild denuded thin tissue at posterior introitus    Labs  Brief Urine Lab Results       None " "           Lab Results   Component Value Date    CREATININE 0.80 02/02/2018       No results found for: \"WBC\", \"HGB\", \"HCT\", \"MCV\", \"PLT\"    PVR  Post-void residual performed by staff - 0mL    I have personally reviewed her labs and post void residual imaging.     Assessment  Ms. Owens is a 64 y.o. female with EDITH urinary incontinence.    We discussed the AUA guidelines for management of stress urinary incontinence.  We discussed served of management with lifestyle changes like weight reduction and pelvic floor physical therapy.  We discussed surgical management with either mid urethral sling versus cystoscopy with bulking agent injection.  The patient has elected MUS.  We discussed the risks, benefits, and alternatives including the risk of mesh erosion.  The patient voiced her understanding and wished to proceed.   We discussed risk for bleeding, infection, damage to surrounding organs, and anesthesia complications.     Plan  1. Consented for MUS with Dr. Kruger  Surgical concerns: BMI 32, sulfa allergy      PAUL Pavon, NP-C  [facility] [place]     "

## 2024-02-02 NOTE — PROGRESS NOTES
February 2, 2024    Hello, may I speak with Hailey Owens? Unable to reach pt.    My name is Earlene    I am  with [facility]  [address]  [address]  [facility] [address] [phone].    Before we get started may I verify your date of birth? 1960    I am calling to officially welcome you to our practice and ask about your recent visit. Is this a good time to talk?     Tell me about your visit with us. What things went well?         We're always looking for ways to make our patients' experiences even better. Do you have recommendations on ways we may improve?      Overall were you satisfied with your first visit to our practice?        I appreciate you taking the time to speak with me today. Is there anything else I can do for you?       Thank you, and have a great day.

## 2024-02-05 DIAGNOSIS — R31.29 MICROSCOPIC HEMATURIA: Primary | ICD-10-CM

## 2024-02-08 ENCOUNTER — TELEPHONE (OUTPATIENT)
Dept: SURGERY | Facility: OTHER | Age: 64
End: 2024-02-08
Payer: MEDICARE

## 2024-02-08 ENCOUNTER — TELEPHONE (OUTPATIENT)
Dept: OBSTETRICS AND GYNECOLOGY | Facility: CLINIC | Age: 64
End: 2024-02-08
Payer: MEDICARE

## 2024-02-08 NOTE — TELEPHONE ENCOUNTER
Spoke with patient, she was very worried as knot large and has family history of vaginal cancer. Patient scheduled for appointment tomorrow.

## 2024-02-08 NOTE — TELEPHONE ENCOUNTER
Caller: Hailey Owens    Relationship to patient: Self    Best call back number: [phone] CAN CALL BACK     Chief complaint: LAINE ON VAGINA     Type of visit: GYN FOLLOW UP    Requested date: 02- IN THE MORNING         Additional notes: PEA SIZE LAINE ON VAGINA IT CAME UP IN THE LAST FEW DAYS.  NO DISCHARGE OR PAIN.    PT WOULD ALSO LIKE TO KNOW IF SHE CAN HAVE A UA AT THE OFFICE, TO FOLLOW UP WITH UROLOGIST,  BLOOD AND PROTEIN WAS FOUND IN URINE ON 01-.  UROLOGIST WOULD SEND LAB ORDER.        PT IS CALLING [employer] TO CHECK ON BENEFITS AND WILL CALL BACK.

## 2024-02-09 ENCOUNTER — LAB (OUTPATIENT)
Dept: LAB | Facility: HOSPITAL | Age: 64
End: 2024-02-09
Payer: MEDICARE

## 2024-02-09 ENCOUNTER — OFFICE VISIT (OUTPATIENT)
Dept: OBSTETRICS AND GYNECOLOGY | Facility: CLINIC | Age: 64
End: 2024-02-09
Payer: MEDICARE

## 2024-02-09 VITALS
BODY MASS INDEX: 32.78 KG/M2 | HEIGHT: 64 IN | DIASTOLIC BLOOD PRESSURE: 64 MMHG | SYSTOLIC BLOOD PRESSURE: 118 MMHG | WEIGHT: 192 LBS

## 2024-02-09 DIAGNOSIS — N90.7 VULVAR CYST: Primary | ICD-10-CM

## 2024-02-09 PROCEDURE — 1159F MED LIST DOCD IN RCRD: CPT | Performed by: PHYSICIAN ASSISTANT

## 2024-02-09 PROCEDURE — 99213 OFFICE O/P EST LOW 20 MIN: CPT | Performed by: PHYSICIAN ASSISTANT

## 2024-02-09 PROCEDURE — 80048 BASIC METABOLIC PNL TOTAL CA: CPT | Performed by: NURSE PRACTITIONER

## 2024-02-09 PROCEDURE — 1160F RVW MEDS BY RX/DR IN RCRD: CPT | Performed by: PHYSICIAN ASSISTANT

## 2024-02-09 RX ORDER — CEPHALEXIN 500 MG/1
500 CAPSULE ORAL 2 TIMES DAILY
Qty: 14 CAPSULE | Refills: 0 | Status: SHIPPED | OUTPATIENT
Start: 2024-02-09 | End: 2024-02-16

## 2024-02-09 NOTE — PROGRESS NOTES
"Subjective   Chief Complaint   Patient presents with    Follow-up     Knot on labia       Hailey Owens is a 64 y.o. year old  presenting to be seen for \"knot\" on upper right labia that came up about 1 week ago   Not really tender or bothersome unless she touches it  No drainage from the area. Has remained about the same size since she noted it      Past Medical History:   Diagnosis Date    Anxiety     Basal cell carcinoma     Elevated cholesterol     Endometriosis     Hysterectomy    High cholesterol     History of Papanicolaou smear of cervix 2015    Ovarian cyst     Hysterectomy    Polycystic ovary syndrome     Hysterectomy    Urinary tract infection     Varicella     childhood        Current Outpatient Medications:     citalopram (CeleXA) 20 MG tablet, Take 1 tablet by mouth Daily., Disp: , Rfl:     estradiol (ESTRACE VAGINAL) 0.1 MG/GM vaginal cream, Insert 1 gm intravaginally 2 times each week, Disp: 42.5 g, Rfl: 4    estradiol (VIVELLE-DOT) 0.025 MG/24HR patch, Place 1 patch on the skin as directed by provider 2 (Two) Times a Week., Disp: 8 patch, Rfl: 12    rosuvastatin (CRESTOR) 40 MG tablet, Take 1 tablet by mouth Daily., Disp: , Rfl:     cephalexin (Keflex) 500 MG capsule, Take 1 capsule by mouth 2 (Two) Times a Day for 7 days., Disp: 14 capsule, Rfl: 0   Allergies   Allergen Reactions    Sulfa Antibiotics Hives and Other (See Comments)      Past Surgical History:   Procedure Laterality Date    BRAIN TUMOR EXCISION      CATARACT EXTRACTION   left eye    2022 rt. eye scheduled    CATARACT EXTRACTION W/ INTRAOCULAR LENS IMPLANT Right 2022    Procedure: CATARACT PHACO EXTRACTION WITH INTRAOCULAR LENS IMPLANT RIGHT;  Surgeon: Puneet Parks MD;  Location: [facility];  Service: Ophthalmology;  Laterality: Right;    HYSTERECTOMY      LAPAROSCOPIC ASSISTED VAGINAL HYSTERECTOMY SALPINGO OOPHORECTOMY      OOPHORECTOMY      PELVIC LAPAROSCOPY      TONSILLECTOMY      TUBAL ABDOMINAL " "LIGATION      WISDOM TOOTH EXTRACTION        Social History     Socioeconomic History    Marital status:    Tobacco Use    Smoking status: Former     Packs/day: 0.00     Years: 10.00     Additional pack years: 0.00     Total pack years: 0.00     Types: Cigarettes     Start date: 1979     Quit date: 1989     Years since quittin.1    Smokeless tobacco: Never    Tobacco comments:     smoked 1 pack a day   Vaping Use    Vaping Use: Never used   Substance and Sexual Activity    Alcohol use: Yes     Alcohol/week: 2.0 standard drinks of alcohol     Types: 2 Glasses of wine per week     Comment: Occ    Drug use: No    Sexual activity: Yes     Partners: Male     Birth control/protection: Hysterectomy     Comment:       Family History   Problem Relation Age of Onset    Stomach cancer Mother     Colon cancer Mother         Stomach and colon cancer    Hyperlipidemia Mother     Brain cancer Father     Stomach cancer Father     Hypertension Sister     Deep vein thrombosis Brother         behind right knee    Diabetes Daughter             Polycystic kidney disease Daughter     Hypertension Daughter         Deseased    Vaginal cancer Maternal Grandmother     Breast cancer Maternal Aunt        Review of Systems   Constitutional:  Negative for chills, diaphoresis and fever.   Gastrointestinal:  Negative for constipation, diarrhea, nausea and vomiting.   Genitourinary:  Negative for difficulty urinating, dysuria, pelvic pain, vaginal bleeding and vaginal discharge.           Objective   /64   Ht 162.6 cm (64\")   Wt 87.1 kg (192 lb)   BMI 32.96 kg/m²     Physical Exam  Constitutional:       Appearance: Normal appearance. She is well-developed and well-groomed.   Eyes:      General: Lids are normal.      Extraocular Movements: Extraocular movements intact.      Conjunctiva/sclera: Conjunctivae normal.   Genitourinary:     Labia:         Right: Lesion present. No rash or injury.         Left: " No rash, tenderness, lesion or injury.           Comments: 1 cm firm nodule right upper labia majora. Slightly tender to touch. nonfluctuant  Neurological:      General: No focal deficit present.      Mental Status: She is alert and oriented to person, place, and time.   Psychiatric:         Attention and Perception: Attention normal.         Mood and Affect: Mood normal.         Speech: Speech normal.         Behavior: Behavior is cooperative.            Result Review :                   Assessment and Plan  Diagnoses and all orders for this visit:    1. Vulvar cyst (Primary)    Other orders  -     cephalexin (Keflex) 500 MG capsule; Take 1 capsule by mouth 2 (Two) Times a Day for 7 days.  Dispense: 14 capsule; Refill: 0      Patient Instructions   Encourage most warm compresses to area 3-4 times daily  RTO prn if cyst enlarges or has pain or other concerns            This note was electronically signed.    Earlene Castaneda PA-C   February 9, 2024

## 2024-02-09 NOTE — PATIENT INSTRUCTIONS
Encourage most warm compresses to area 3-4 times daily  RTO prn if cyst enlarges or has pain or other concerns

## 2024-02-13 LAB
APPEARANCE UR: ABNORMAL
BACTERIA #/AREA URNS HPF: ABNORMAL /HPF
BILIRUB UR QL STRIP: NEGATIVE
CASTS URNS MICRO: ABNORMAL
COLOR UR: YELLOW
EPI CELLS #/AREA URNS HPF: ABNORMAL /HPF
GLUCOSE UR QL STRIP: NEGATIVE
HGB UR QL STRIP: ABNORMAL
KETONES UR QL STRIP: NEGATIVE
LEUKOCYTE ESTERASE UR QL STRIP: NEGATIVE
NITRITE UR QL STRIP: NEGATIVE
PH UR STRIP: 5.5 [PH] (ref 5–8)
PROT UR QL STRIP: ABNORMAL
RBC #/AREA URNS HPF: ABNORMAL /HPF
SP GR UR STRIP: 1.02 (ref 1–1.03)
UROBILINOGEN UR STRIP-MCNC: ABNORMAL MG/DL
WBC #/AREA URNS HPF: ABNORMAL /HPF

## 2024-03-05 ENCOUNTER — OFFICE VISIT (OUTPATIENT)
Dept: UROLOGY | Facility: CLINIC | Age: 64
End: 2024-03-05
Payer: MEDICARE

## 2024-03-05 VITALS — DIASTOLIC BLOOD PRESSURE: 78 MMHG | SYSTOLIC BLOOD PRESSURE: 132 MMHG

## 2024-03-05 DIAGNOSIS — R39.9 LOWER URINARY TRACT SYMPTOMS (LUTS): ICD-10-CM

## 2024-03-05 DIAGNOSIS — N39.3 SUI (STRESS URINARY INCONTINENCE, FEMALE): Primary | ICD-10-CM

## 2024-03-05 LAB
BILIRUB BLD-MCNC: NEGATIVE MG/DL
CLARITY, POC: CLEAR
COLOR UR: YELLOW
EXPIRATION DATE: ABNORMAL
GLUCOSE UR STRIP-MCNC: NEGATIVE MG/DL
KETONES UR QL: NEGATIVE
LEUKOCYTE EST, POC: NEGATIVE
Lab: ABNORMAL
NITRITE UR-MCNC: NEGATIVE MG/ML
PH UR: 6 [PH] (ref 5–8)
PROT UR STRIP-MCNC: NEGATIVE MG/DL
RBC # UR STRIP: ABNORMAL /UL
SP GR UR: 1 (ref 1–1.03)
UROBILINOGEN UR QL: NORMAL

## 2024-03-05 NOTE — PROGRESS NOTES
[facility] Female Office Visit      Patient Name: Hailey Owens  : 1960   MRN: [id number]     Chief Complaint:  Lower Urinary Tract Symptoms.   Chief Complaint   Patient presents with    Follow-up       Referring Provider: No ref. provider found    History of Present Illness: Mrs. Owens is a 64 y.o. female with history of EDITH s/p Bulkamid performed on .  She reports that she had no incontinence episodes for approximately 5 days then had a big sneeze and leaked.  Has continued to use 2 pads per day and is overall disappointed in her results.  Denies UUI symptoms today.      Subjective      Review of System: Review of Systems   Constitutional:  Negative for chills, diaphoresis and fever.   Gastrointestinal:  Negative for abdominal pain, constipation, diarrhea and vomiting.   Genitourinary:  Negative for decreased urine volume, dysuria, flank pain, frequency, hematuria and urgency.        Urinary incontinence        Past Medical History:  Past Medical History:   Diagnosis Date    Anxiety     Basal cell carcinoma     Elevated cholesterol     Endometriosis     Hysterectomy    High cholesterol     History of Papanicolaou smear of cervix 2015    Ovarian cyst     Hysterectomy    Polycystic ovary syndrome     Hysterectomy    Urinary tract infection     Varicella     childhood       Past Surgical History:  Past Surgical History:   Procedure Laterality Date    BRAIN TUMOR EXCISION      CATARACT EXTRACTION  2015 left eye    2022 rt. eye scheduled    CATARACT EXTRACTION W/ INTRAOCULAR LENS IMPLANT Right 2022    Procedure: CATARACT PHACO EXTRACTION WITH INTRAOCULAR LENS IMPLANT RIGHT;  Surgeon: Puneet Parks MD;  Location: [facility];  Service: Ophthalmology;  Laterality: Right;    HYSTERECTOMY      LAPAROSCOPIC ASSISTED VAGINAL HYSTERECTOMY SALPINGO OOPHORECTOMY      OOPHORECTOMY      PELVIC LAPAROSCOPY      TONSILLECTOMY      TUBAL ABDOMINAL LIGATION      WISDOM TOOTH EXTRACTION          Medications:    Current Outpatient Medications:     citalopram (CeleXA) 20 MG tablet, Take 1 tablet by mouth Daily., Disp: , Rfl:     estradiol (ESTRACE VAGINAL) 0.1 MG/GM vaginal cream, Insert 1 gm intravaginally 2 times each week, Disp: 42.5 g, Rfl: 4    estradiol (VIVELLE-DOT) 0.025 MG/24HR patch, Place 1 patch on the skin as directed by provider 2 (Two) Times a Week., Disp: 8 patch, Rfl: 12    rosuvastatin (CRESTOR) 40 MG tablet, Take 1 tablet by mouth Daily., Disp: , Rfl:     Allergies:  Allergies   Allergen Reactions    Sulfa Antibiotics Hives and Other (See Comments)       Social History:  Social History     Socioeconomic History    Marital status:    Tobacco Use    Smoking status: Former     Current packs/day: 0.00     Types: Cigarettes     Start date: 1979     Quit date: 1989     Years since quittin.1    Smokeless tobacco: Never    Tobacco comments:     smoked 1 pack a day   Vaping Use    Vaping status: Never Used   Substance and Sexual Activity    Alcohol use: Yes     Alcohol/week: 2.0 standard drinks of alcohol     Types: 2 Glasses of wine per week     Comment: Occ    Drug use: No    Sexual activity: Yes     Partners: Male     Birth control/protection: Hysterectomy     Comment:        Family History:  Family History   Problem Relation Age of Onset    Stomach cancer Mother     Colon cancer Mother         Stomach and colon cancer    Hyperlipidemia Mother     Brain cancer Father     Stomach cancer Father     Hypertension Sister     Deep vein thrombosis Brother         behind right knee    Diabetes Daughter             Polycystic kidney disease Daughter     Hypertension Daughter         Deseased    Vaginal cancer Maternal Grandmother     Breast cancer Maternal Aunt        Objective     Physical Exam:   Vital Signs:   Vitals:    24 1546   BP: 132/78   BP Location: Left arm   Patient Position: Sitting   Cuff Size: Adult     There is no height or weight on file to  calculate BMI.     Physical Exam  Vitals and nursing note reviewed.   Constitutional:       General: She is not in acute distress.     Appearance: Normal appearance. She is not ill-appearing.   HENT:      Head: Normocephalic.   Pulmonary:      Effort: Pulmonary effort is normal.   Neurological:      Mental Status: She is alert and oriented to person, place, and time.   Psychiatric:         Mood and Affect: Mood normal.         Behavior: Behavior normal.       Labs:   Brief Urine Lab Results  (Last result in the past 365 days)        Color   Clarity   Blood   Leuk Est   Nitrite   Protein   CREAT   Urine HCG        03/05/24 1607 Yellow   Clear   Trace   Negative   Negative   Negative                  Lab Results   Component Value Date    GLUCOSE 81 02/09/2024    CALCIUM 9.4 02/09/2024     02/09/2024    K 3.8 02/09/2024    CO2 25.4 02/09/2024     (H) 02/09/2024    BUN 12 02/09/2024    CREATININE 0.91 02/09/2024    BCR 13.2 02/09/2024    ANIONGAP 11.6 02/09/2024     Urine Incontinence: EDITH.     PVR: 0 ml       Assessment / Plan      Assessment  Ms. Owens is a 64 y.o. female with history of stress urinary incontinence s/p Bulkamid performed on February 20th.  She continues to experience stress urinary incontinence and is using 2 pads per day.  PVR today is 0 ml.  UA was negative for infection.  Discussed case with Dr. Kruger and he recommended that she follow up to discuss midurethral sling in in 6-8 weeks.  Mrs. Owens would like to do a trial of weight loss by changing diet and increasing physical activity and would like to follow up with Dr. Kruger in approximately 6 months.  Offered PFPT and patient declined at this time.      Plan  1. Patient to try to lose weight to see if symptoms are improved.    2. Plan to have Mrs. Owens follow up in 6 months per her request with Dr. Kruger to discuss midurethral sling.      Krystyna Yost, APRN, MSN, FNP-C  [facility]

## 2024-09-06 ENCOUNTER — OFFICE VISIT (OUTPATIENT)
Dept: OBSTETRICS AND GYNECOLOGY | Facility: CLINIC | Age: 64
End: 2024-09-06
Payer: MEDICARE

## 2024-09-06 VITALS
BODY MASS INDEX: 32.5 KG/M2 | DIASTOLIC BLOOD PRESSURE: 62 MMHG | HEIGHT: 64 IN | WEIGHT: 190.4 LBS | SYSTOLIC BLOOD PRESSURE: 148 MMHG

## 2024-09-06 DIAGNOSIS — L29.2 VULVAR ITCHING: Primary | ICD-10-CM

## 2024-09-06 DIAGNOSIS — L30.4 INTERTRIGO: ICD-10-CM

## 2024-09-06 PROCEDURE — 1159F MED LIST DOCD IN RCRD: CPT | Performed by: PHYSICIAN ASSISTANT

## 2024-09-06 PROCEDURE — 99213 OFFICE O/P EST LOW 20 MIN: CPT | Performed by: PHYSICIAN ASSISTANT

## 2024-09-06 PROCEDURE — 1160F RVW MEDS BY RX/DR IN RCRD: CPT | Performed by: PHYSICIAN ASSISTANT

## 2024-09-06 RX ORDER — FLUCONAZOLE 150 MG/1
TABLET ORAL
Qty: 2 TABLET | Refills: 1 | Status: SHIPPED | OUTPATIENT
Start: 2024-09-06

## 2024-09-06 RX ORDER — CLOTRIMAZOLE AND BETAMETHASONE DIPROPIONATE 10; .64 MG/G; MG/G
CREAM TOPICAL
Qty: 30 G | Refills: 0 | Status: SHIPPED | OUTPATIENT
Start: 2024-09-06

## 2024-09-06 RX ORDER — ESTRADIOL 0.1 MG/G
CREAM VAGINAL
Qty: 42.5 G | Refills: 4 | Status: SHIPPED | OUTPATIENT
Start: 2024-09-06

## 2024-09-06 NOTE — PATIENT INSTRUCTIONS
Suspect yeast of skin   Follow up prn if symptoms not resolved with fluconazole and Lotrisone  Loose airy clothing at home

## 2024-09-06 NOTE — PROGRESS NOTES
Subjective   Chief Complaint   Patient presents with    vaginal rash     Patient complains of vaginal rash       Hailey Owens is a 64 y.o. year old  presenting to be seen for external vulvar and groin rash.  Rash is red and itches and burns  Had mild rash for several weeks but got much worse when on vacation recently and did a lot of walking  Had Bulkamid therapy in February which did not work. Continues to wear pads daily for EDITH    Past Medical History:   Diagnosis Date    Anxiety     Basal cell carcinoma     Elevated cholesterol     Endometriosis     Hysterectomy    High cholesterol     History of Papanicolaou smear of cervix     Ovarian cyst     Hysterectomy    Polycystic ovary syndrome     Hysterectomy    Urinary tract infection     Varicella     childhood        Current Outpatient Medications:     citalopram (CeleXA) 20 MG tablet, Take 1 tablet by mouth Daily., Disp: , Rfl:     estradiol (ESTRACE VAGINAL) 0.1 MG/GM vaginal cream, Insert 1 gm intravaginally 2 times each week, Disp: 42.5 g, Rfl: 4    estradiol (VIVELLE-DOT) 0.025 MG/24HR patch, Place 1 patch on the skin as directed by provider 2 (Two) Times a Week., Disp: 8 patch, Rfl: 12    rosuvastatin (CRESTOR) 40 MG tablet, Take 1 tablet by mouth Daily., Disp: , Rfl:     clotrimazole-betamethasone (LOTRISONE) 1-0.05 % cream, Apply bid to affected area for 7 days, Disp: 30 g, Rfl: 0    fluconazole (Diflucan) 150 MG tablet, One po now and repeat in 3 days, Disp: 2 tablet, Rfl: 1   Allergies   Allergen Reactions    Sulfa Antibiotics Hives and Other (See Comments)      Past Surgical History:   Procedure Laterality Date    BRAIN TUMOR EXCISION      CATARACT EXTRACTION   left eye    2022 rt. eye scheduled    CATARACT EXTRACTION W/ INTRAOCULAR LENS IMPLANT Right 2022    Procedure: CATARACT PHACO EXTRACTION WITH INTRAOCULAR LENS IMPLANT RIGHT;  Surgeon: Puneet Parks MD;  Location: [facility];  Service: Ophthalmology;  Laterality:  "Right;    HYSTERECTOMY      LAPAROSCOPIC ASSISTED VAGINAL HYSTERECTOMY SALPINGO OOPHORECTOMY      OOPHORECTOMY      PELVIC LAPAROSCOPY      TONSILLECTOMY      TUBAL ABDOMINAL LIGATION      WISDOM TOOTH EXTRACTION        Social History     Socioeconomic History    Marital status:    Tobacco Use    Smoking status: Former     Current packs/day: 0.00     Types: Cigarettes     Start date: 1979     Quit date: 1989     Years since quittin.7    Smokeless tobacco: Never    Tobacco comments:     smoked 1 pack a day   Vaping Use    Vaping status: Never Used   Substance and Sexual Activity    Alcohol use: Yes     Alcohol/week: 2.0 standard drinks of alcohol     Types: 2 Glasses of wine per week     Comment: Occ    Drug use: No    Sexual activity: Yes     Partners: Male     Birth control/protection: Hysterectomy     Comment:       Family History   Problem Relation Age of Onset    Stomach cancer Mother     Colon cancer Mother         Stomach and colon cancer    Hyperlipidemia Mother     Brain cancer Father     Stomach cancer Father     Hypertension Sister     Deep vein thrombosis Brother         behind right knee    Diabetes Daughter             Polycystic kidney disease Daughter     Hypertension Daughter         Deseased    Vaginal cancer Maternal Grandmother     Breast cancer Maternal Aunt        Review of Systems   Constitutional:  Negative for chills, diaphoresis and fever.   Gastrointestinal:  Negative for constipation, diarrhea, nausea and vomiting.   Genitourinary:  Negative for difficulty urinating, dysuria, pelvic pain, vaginal bleeding and vaginal discharge.           Objective   /62   Ht 162.6 cm (64\")   Wt 86.4 kg (190 lb 6.4 oz)   BMI 32.68 kg/m²     Physical Exam  Exam conducted with a chaperone present.   Constitutional:       Appearance: Normal appearance. She is well-developed and well-groomed.   Eyes:      General: Lids are normal.      Extraocular Movements: " Extraocular movements intact.      Conjunctiva/sclera: Conjunctivae normal.   Genitourinary:     Labia:         Right: Rash present. No tenderness, lesion or injury.         Left: Rash present. No tenderness, lesion or injury.       Urethra: No prolapse, urethral pain, urethral swelling or urethral lesion.          Comments: Labia majora bilaterally beefy red as well as groin extending up to mons pubis. No lesions   Neurological:      General: No focal deficit present.      Mental Status: She is alert and oriented to person, place, and time.   Psychiatric:         Attention and Perception: Attention normal.         Mood and Affect: Mood normal.         Speech: Speech normal.         Behavior: Behavior is cooperative.            Result Review :                   Assessment and Plan  Diagnoses and all orders for this visit:    1. Vulvar itching (Primary)    2. Intertrigo    Other orders  -     estradiol (ESTRACE VAGINAL) 0.1 MG/GM vaginal cream; Insert 1 gm intravaginally 2 times each week  Dispense: 42.5 g; Refill: 4  -     fluconazole (Diflucan) 150 MG tablet; One po now and repeat in 3 days  Dispense: 2 tablet; Refill: 1  -     clotrimazole-betamethasone (LOTRISONE) 1-0.05 % cream; Apply bid to affected area for 7 days  Dispense: 30 g; Refill: 0      Patient Instructions   Suspect yeast of skin   Follow up prn if symptoms not resolved with fluconazole and Lotrisone  Loose airy clothing at home           This note was electronically signed.    Earlene Castaneda PA-C   September 6, 2024

## 2025-02-03 ENCOUNTER — TRANSCRIBE ORDERS (OUTPATIENT)
Dept: ADMINISTRATIVE | Facility: HOSPITAL | Age: 65
End: 2025-02-03
Payer: MEDICARE

## 2025-02-03 DIAGNOSIS — Z12.31 SCREENING MAMMOGRAM FOR BREAST CANCER: Primary | ICD-10-CM

## 2025-03-03 RX ORDER — CLOTRIMAZOLE AND BETAMETHASONE DIPROPIONATE 10; .64 MG/G; MG/G
CREAM TOPICAL
Qty: 30 G | Refills: 0 | Status: SHIPPED | OUTPATIENT
Start: 2025-03-03

## 2025-03-06 RX ORDER — ESTRADIOL 0.03 MG/D
1 FILM, EXTENDED RELEASE TRANSDERMAL 2 TIMES WEEKLY
Qty: 8 PATCH | Refills: 12 | Status: SHIPPED | OUTPATIENT
Start: 2025-03-06

## 2025-03-31 ENCOUNTER — HOSPITAL ENCOUNTER (OUTPATIENT)
Dept: MAMMOGRAPHY | Facility: HOSPITAL | Age: 65
Discharge: HOME | End: 2025-03-31
Admitting: OBSTETRICS & GYNECOLOGY
Payer: MEDICARE

## 2025-03-31 DIAGNOSIS — Z12.31 SCREENING MAMMOGRAM FOR BREAST CANCER: ICD-10-CM

## 2025-03-31 PROCEDURE — 77063 BREAST TOMOSYNTHESIS BI: CPT

## 2025-03-31 PROCEDURE — 77067 SCR MAMMO BI INCL CAD: CPT

## 2025-06-24 ENCOUNTER — OFFICE VISIT (OUTPATIENT)
Dept: NEUROLOGY | Facility: CLINIC | Age: 65
End: 2025-06-24
Payer: MEDICARE

## 2025-06-24 VITALS
BODY MASS INDEX: 33.12 KG/M2 | HEIGHT: 64 IN | HEART RATE: 82 BPM | DIASTOLIC BLOOD PRESSURE: 80 MMHG | SYSTOLIC BLOOD PRESSURE: 132 MMHG | OXYGEN SATURATION: 100 % | WEIGHT: 194 LBS

## 2025-06-24 DIAGNOSIS — G25.0 ESSENTIAL TREMOR: Primary | ICD-10-CM

## 2025-06-24 PROCEDURE — 1160F RVW MEDS BY RX/DR IN RCRD: CPT | Performed by: PSYCHIATRY & NEUROLOGY

## 2025-06-24 PROCEDURE — 99203 OFFICE O/P NEW LOW 30 MIN: CPT | Performed by: PSYCHIATRY & NEUROLOGY

## 2025-06-24 PROCEDURE — 1159F MED LIST DOCD IN RCRD: CPT | Performed by: PSYCHIATRY & NEUROLOGY

## 2025-06-24 NOTE — PROGRESS NOTES
Subjective   Patient ID: Hailey Owens is a 65 y.o. female     Chief Complaint   Patient presents with    Tremors     LUE        History of Present Illness    65 y.o. female referred by Dr Miko Owens for tremors.     MRI Brain, my review of films, 18 rightward deviation of pituitary stalk     Left hand started having tremors.  Holding make up.  Intermittent.      Provoked by posture.      CBC,CMP, TSH NCS     Reviewed medical records:    Pituitary adenoma rx 2009     Tremor in left hand  Involuntary.      Past Medical History:   Diagnosis Date    Anxiety     Basal cell carcinoma     Elevated cholesterol     Endometriosis     Hysterectomy    High cholesterol     History of Papanicolaou smear of cervix     Ovarian cyst     Hysterectomy    Polycystic ovary syndrome     Hysterectomy    Urinary incontinence     Cystoscopy done by Dr. Atul Kruger on 3/5/24    Urinary tract infection     rarely    Varicella     childhood     Family History   Problem Relation Age of Onset    Stomach cancer Mother     Colon cancer Mother         Stomach and colon cancer    Hyperlipidemia Mother     Cancer Mother         Stomach, colon    Brain cancer Father     Stomach cancer Father     Cancer Father         Abdominal, brain    Hypertension Sister     Deep vein thrombosis Brother         behind right knee    Diabetes Daughter             Polycystic kidney disease Daughter     Hypertension Daughter         Deseased    Vaginal cancer Maternal Grandmother     Breast cancer Maternal Aunt     Cancer Maternal Aunt         3 maternal aunts, 2 maternal uncles    Ovarian cancer Neg Hx      Social History     Socioeconomic History    Marital status:    Tobacco Use    Smoking status: Former     Current packs/day: 0.00     Average packs/day: 1 pack/day for 10.0 years (10.0 ttl pk-yrs)     Types: Cigarettes     Start date: 1979     Quit date: 1989     Years since quittin.5     Passive exposure: Past    Smokeless  "tobacco: Never    Tobacco comments:     smoked 1 pack a day   Vaping Use    Vaping status: Never Used   Substance and Sexual Activity    Alcohol use: Yes     Alcohol/week: 2.0 standard drinks of alcohol     Types: 2 Glasses of wine per week     Comment: I do not drink wine every week    Drug use: Never    Sexual activity: Yes     Partners: Male     Birth control/protection: Hysterectomy     Comment:        Review of Systems   Constitutional:  Negative for activity change, fatigue and unexpected weight change.   HENT:  Negative for tinnitus and trouble swallowing.    Eyes:  Negative for photophobia and visual disturbance.   Respiratory:  Negative for apnea, cough and choking.    Cardiovascular:  Negative for leg swelling.   Gastrointestinal:  Negative for nausea and vomiting.   Endocrine: Negative for cold intolerance and heat intolerance.   Genitourinary:  Negative for difficulty urinating, frequency, menstrual problem and urgency.   Musculoskeletal:  Negative for back pain, gait problem, myalgias and neck pain.   Skin:  Negative for color change and rash.   Allergic/Immunologic: Negative for immunocompromised state.   Neurological:  Positive for tremors. Negative for dizziness, seizures, syncope, facial asymmetry, speech difficulty, weakness, light-headedness, numbness and headaches.   Hematological:  Negative for adenopathy. Does not bruise/bleed easily.   Psychiatric/Behavioral:  Negative for behavioral problems, confusion, decreased concentration, hallucinations and sleep disturbance. The patient is nervous/anxious.        Objective     Vitals:    06/24/25 1022   BP: 132/80   Pulse: 82   SpO2: 100%   Weight: 88 kg (194 lb)   Height: 162.6 cm (64.02\")       Neurological Exam  Mental Status  Awake, alert and oriented to person, place and time. Oriented to person, place and time. Speech is normal. Language is fluent with no aphasia. Attention and concentration are normal. Fund of knowledge is appropriate for " level of education.    Cranial Nerves  CN III, IV, VI: Extraocular movements intact bilaterally. Pupils equal round and reactive to light bilaterally.  CN V: Facial sensation is normal.  CN VII: Full and symmetric facial movement.  CN IX, X: Palate elevates symmetrically  CN XI: Shoulder shrug strength is normal.  CN XII: Tongue midline without atrophy or fasciculations.    Motor   Strength is 5/5 throughout all four extremities.    Sensory  Sensation is intact to light touch, pinprick, vibration and proprioception in all four extremities.    Reflexes  Deep tendon reflexes are 2+ and symmetric in all four extremities.    Coordination    Finger-to-nose, rapid alternating movements and heel-to-shin normal bilaterally without dysmetria.    Gait  Normal casual, toe, heel and tandem gait.       Physical Exam  Eyes:      Extraocular Movements: Extraocular movements intact.      Pupils: Pupils are equal, round, and reactive to light.   Neurological:      Motor: Motor strength is normal.     Coordination: Coordination is intact.      Deep Tendon Reflexes: Reflexes are normal and symmetric.   Psychiatric:         Speech: Speech normal.         Office Visit on 03/05/2024   Component Date Value Ref Range Status    Color 03/05/2024 Yellow  Yellow, Straw, Dark Yellow, Aftou Final    Clarity, UA 03/05/2024 Clear  Clear Final    Specific Gravity  03/05/2024 1.005  1.005 - 1.030 Final    pH, Urine 03/05/2024 6.0  5.0 - 8.0 Final    Leukocytes 03/05/2024 Negative  Negative Final    Nitrite, UA 03/05/2024 Negative  Negative Final    Protein, POC 03/05/2024 Negative  Negative mg/dL Final    Glucose, UA 03/05/2024 Negative  Negative mg/dL Final    Ketones, UA 03/05/2024 Negative  Negative Final    Urobilinogen, UA 03/05/2024 Normal  Normal, 0.2 E.U./dL Final    Bilirubin 03/05/2024 Negative  Negative Final    Blood, UA 03/05/2024 Trace (A)  Negative Final    Lot Number 03/05/2024 [id number]   Final    Expiration Date 03/05/2024  01/14/2025   Final         Assessment & Plan     Problem List Items Addressed This Visit          Neuro    Essential tremor - Primary    Current Assessment & Plan   Mild sx    Watchful waiting                No follow-ups on file.

## (undated) DEVICE — NDL FLTR2 THN 19G 1IN

## (undated) DEVICE — EYE CARE POST OP KIT: Brand: MEDLINE INDUSTRIES, INC.

## (undated) DEVICE — GLV SURG SENSICARE W/ALOE PF LF 7.5 STRL

## (undated) DEVICE — CANN IRR/INJ AIR ANT CHAMBER 6MM BEND 27G

## (undated) DEVICE — CANN HYDRODISSECTION

## (undated) DEVICE — 0.8MM CLEARPORT PARA KNIFE: Brand: SHARPOINT

## (undated) DEVICE — MICROSURGICAL INSTRUMENT IRR CYSTITOME 27GA FORMED-BAFFLE CUTTING: Brand: ALCON

## (undated) DEVICE — SYR LL 3CC

## (undated) DEVICE — Device

## (undated) DEVICE — TOWEL,OR,DSP,ST,BLUE,STD,4/PK,20PK/CS: Brand: MEDLINE

## (undated) DEVICE — HP CONCL INTREPID COAX I/A CRV .3MM

## (undated) DEVICE — SOL IRRIG H2O 1000ML STRL

## (undated) DEVICE — CLEARCUT® SLIT KNIFE INTREPID MICRO-COAXIAL SYSTEM 2.4 SB: Brand: CLEARCUT®; INTREPID